# Patient Record
Sex: MALE | Race: WHITE | NOT HISPANIC OR LATINO
[De-identification: names, ages, dates, MRNs, and addresses within clinical notes are randomized per-mention and may not be internally consistent; named-entity substitution may affect disease eponyms.]

---

## 2017-01-07 ENCOUNTER — APPOINTMENT (OUTPATIENT)
Dept: ORTHOPEDIC SURGERY | Facility: CLINIC | Age: 57
End: 2017-01-07

## 2017-01-07 VITALS
SYSTOLIC BLOOD PRESSURE: 136 MMHG | WEIGHT: 230 LBS | DIASTOLIC BLOOD PRESSURE: 89 MMHG | HEIGHT: 74 IN | HEART RATE: 64 BPM | BODY MASS INDEX: 29.52 KG/M2

## 2017-01-30 ENCOUNTER — APPOINTMENT (OUTPATIENT)
Dept: ELECTROPHYSIOLOGY | Facility: CLINIC | Age: 57
End: 2017-01-30

## 2017-02-14 ENCOUNTER — APPOINTMENT (OUTPATIENT)
Dept: ELECTROPHYSIOLOGY | Facility: CLINIC | Age: 57
End: 2017-02-14

## 2017-03-03 ENCOUNTER — APPOINTMENT (OUTPATIENT)
Dept: ELECTROPHYSIOLOGY | Facility: CLINIC | Age: 57
End: 2017-03-03

## 2017-04-03 ENCOUNTER — APPOINTMENT (OUTPATIENT)
Dept: ELECTROPHYSIOLOGY | Facility: CLINIC | Age: 57
End: 2017-04-03

## 2017-04-12 ENCOUNTER — APPOINTMENT (OUTPATIENT)
Dept: ORTHOPEDIC SURGERY | Facility: CLINIC | Age: 57
End: 2017-04-12

## 2017-04-12 VITALS
HEART RATE: 73 BPM | HEIGHT: 74 IN | BODY MASS INDEX: 29.52 KG/M2 | WEIGHT: 230 LBS | DIASTOLIC BLOOD PRESSURE: 80 MMHG | SYSTOLIC BLOOD PRESSURE: 138 MMHG

## 2017-04-14 LAB
B PERT IGG+IGM PNL SER: ABNORMAL
COLOR FLD: NORMAL
CRYSTALS SNV QL MICRO: NORMAL
FLUID INTAKE SUBSTANCE CLASS: NORMAL
LYMPHOCYTES # FLD MANUAL: 12 %
MONOS+MACROS NFR FLD MANUAL: 83 %
NEUTS SEG # FLD MANUAL: 5 %
RBC # FLD MANUAL: ABNORMAL /UL
TOTAL CELLS COUNTED FLD: 840 /UL
TUBE TYPE: NORMAL

## 2017-04-19 LAB — BACTERIA FLD CULT: NORMAL

## 2017-05-05 ENCOUNTER — APPOINTMENT (OUTPATIENT)
Dept: ELECTROPHYSIOLOGY | Facility: CLINIC | Age: 57
End: 2017-05-05

## 2017-07-25 ENCOUNTER — OUTPATIENT (OUTPATIENT)
Dept: OUTPATIENT SERVICES | Facility: HOSPITAL | Age: 57
LOS: 1 days | End: 2017-07-25
Payer: COMMERCIAL

## 2017-07-25 VITALS
SYSTOLIC BLOOD PRESSURE: 150 MMHG | RESPIRATION RATE: 18 BRPM | WEIGHT: 246.92 LBS | TEMPERATURE: 98 F | HEIGHT: 74 IN | HEART RATE: 66 BPM | DIASTOLIC BLOOD PRESSURE: 89 MMHG

## 2017-07-25 DIAGNOSIS — Z98.61 CORONARY ANGIOPLASTY STATUS: Chronic | ICD-10-CM

## 2017-07-25 DIAGNOSIS — Z96.653 PRESENCE OF ARTIFICIAL KNEE JOINT, BILATERAL: Chronic | ICD-10-CM

## 2017-07-25 DIAGNOSIS — I10 ESSENTIAL (PRIMARY) HYPERTENSION: ICD-10-CM

## 2017-07-25 DIAGNOSIS — Z01.818 ENCOUNTER FOR OTHER PREPROCEDURAL EXAMINATION: ICD-10-CM

## 2017-07-25 DIAGNOSIS — Z98.890 OTHER SPECIFIED POSTPROCEDURAL STATES: Chronic | ICD-10-CM

## 2017-07-25 DIAGNOSIS — Z90.89 ACQUIRED ABSENCE OF OTHER ORGANS: Chronic | ICD-10-CM

## 2017-07-25 DIAGNOSIS — T84.84XA PAIN DUE TO INTERNAL ORTHOPEDIC PROSTHETIC DEVICES, IMPLANTS AND GRAFTS, INITIAL ENCOUNTER: ICD-10-CM

## 2017-07-25 DIAGNOSIS — Z98.89 OTHER SPECIFIED POSTPROCEDURAL STATES: Chronic | ICD-10-CM

## 2017-07-25 LAB
ANION GAP SERPL CALC-SCNC: 13 MMOL/L — SIGNIFICANT CHANGE UP (ref 5–17)
APTT BLD: 27.5 SEC — SIGNIFICANT CHANGE UP (ref 27.5–37.4)
BASOPHILS # BLD AUTO: 0 K/UL — SIGNIFICANT CHANGE UP (ref 0–0.2)
BASOPHILS NFR BLD AUTO: 0.3 % — SIGNIFICANT CHANGE UP (ref 0–2)
BUN SERPL-MCNC: 20 MG/DL — SIGNIFICANT CHANGE UP (ref 8–20)
CALCIUM SERPL-MCNC: 9.2 MG/DL — SIGNIFICANT CHANGE UP (ref 8.6–10.2)
CHLORIDE SERPL-SCNC: 107 MMOL/L — SIGNIFICANT CHANGE UP (ref 98–107)
CO2 SERPL-SCNC: 24 MMOL/L — SIGNIFICANT CHANGE UP (ref 22–29)
CREAT SERPL-MCNC: 0.83 MG/DL — SIGNIFICANT CHANGE UP (ref 0.5–1.3)
EOSINOPHIL # BLD AUTO: 0.2 K/UL — SIGNIFICANT CHANGE UP (ref 0–0.5)
EOSINOPHIL NFR BLD AUTO: 3.1 % — SIGNIFICANT CHANGE UP (ref 0–5)
GLUCOSE SERPL-MCNC: 123 MG/DL — HIGH (ref 70–115)
HCT VFR BLD CALC: 41 % — LOW (ref 42–52)
HGB BLD-MCNC: 14.3 G/DL — SIGNIFICANT CHANGE UP (ref 14–18)
INR BLD: 0.96 RATIO — SIGNIFICANT CHANGE UP (ref 0.88–1.16)
LYMPHOCYTES # BLD AUTO: 1.4 K/UL — SIGNIFICANT CHANGE UP (ref 1–4.8)
LYMPHOCYTES # BLD AUTO: 24 % — SIGNIFICANT CHANGE UP (ref 20–55)
MCHC RBC-ENTMCNC: 30.2 PG — SIGNIFICANT CHANGE UP (ref 27–31)
MCHC RBC-ENTMCNC: 34.9 G/DL — SIGNIFICANT CHANGE UP (ref 32–36)
MCV RBC AUTO: 86.5 FL — SIGNIFICANT CHANGE UP (ref 80–94)
MONOCYTES # BLD AUTO: 0.4 K/UL — SIGNIFICANT CHANGE UP (ref 0–0.8)
MONOCYTES NFR BLD AUTO: 6.5 % — SIGNIFICANT CHANGE UP (ref 3–10)
NEUTROPHILS # BLD AUTO: 3.8 K/UL — SIGNIFICANT CHANGE UP (ref 1.8–8)
NEUTROPHILS NFR BLD AUTO: 65.6 % — SIGNIFICANT CHANGE UP (ref 37–73)
PLATELET # BLD AUTO: 143 K/UL — LOW (ref 150–400)
POTASSIUM SERPL-MCNC: 3.9 MMOL/L — SIGNIFICANT CHANGE UP (ref 3.5–5.3)
POTASSIUM SERPL-SCNC: 3.9 MMOL/L — SIGNIFICANT CHANGE UP (ref 3.5–5.3)
PROTHROM AB SERPL-ACNC: 10.6 SEC — SIGNIFICANT CHANGE UP (ref 9.8–12.7)
RBC # BLD: 4.74 M/UL — SIGNIFICANT CHANGE UP (ref 4.6–6.2)
RBC # FLD: 13.2 % — SIGNIFICANT CHANGE UP (ref 11–15.6)
SODIUM SERPL-SCNC: 144 MMOL/L — SIGNIFICANT CHANGE UP (ref 135–145)
WBC # BLD: 5.8 K/UL — SIGNIFICANT CHANGE UP (ref 4.8–10.8)
WBC # FLD AUTO: 5.8 K/UL — SIGNIFICANT CHANGE UP (ref 4.8–10.8)

## 2017-07-25 PROCEDURE — 85027 COMPLETE CBC AUTOMATED: CPT

## 2017-07-25 PROCEDURE — 93010 ELECTROCARDIOGRAM REPORT: CPT

## 2017-07-25 PROCEDURE — G0463: CPT

## 2017-07-25 PROCEDURE — 85610 PROTHROMBIN TIME: CPT

## 2017-07-25 PROCEDURE — 36415 COLL VENOUS BLD VENIPUNCTURE: CPT

## 2017-07-25 PROCEDURE — 85730 THROMBOPLASTIN TIME PARTIAL: CPT

## 2017-07-25 PROCEDURE — 80048 BASIC METABOLIC PNL TOTAL CA: CPT

## 2017-07-25 PROCEDURE — 93005 ELECTROCARDIOGRAM TRACING: CPT

## 2017-07-25 NOTE — H&P PST ADULT - PMH
Back pain at L4-L5 level  has a spinal stimulator  Dyslipidemia    HTN (hypertension)    Sleep apnea with use of continuous positive airway pressure (CPAP) Asthma  Allergy induced, rarely uses Proventil  Back pain at L4-L5 level  has a spinal stimulator  HTN (hypertension)    Sleep apnea with use of continuous positive airway pressure (CPAP)

## 2017-07-25 NOTE — H&P PST ADULT - BLOOD TRANSFUSION, PREVIOUS, PROFILE
Dr. Pickens Note: unlikely fractured given exam, advised for xray to r/o occult tib fx, pt declined
no

## 2017-07-25 NOTE — ASU PATIENT PROFILE, ADULT - PMH
Asthma  Allergy induced, rarely uses Proventil  Back pain at L4-L5 level  has a spinal stimulator  HTN (hypertension)    Sleep apnea with use of continuous positive airway pressure (CPAP)

## 2017-07-25 NOTE — H&P PST ADULT - ASSESSMENT
56M PMH HTN, Asthma, Sleep Apnea, Chronic Back Pain and Osteoarthritis with painful total left knee replacement for Left Knee Arthroscopy, Partial Synovectomy.

## 2017-07-25 NOTE — H&P PST ADULT - HISTORY OF PRESENT ILLNESS
56M with left knee pain, following bilateral total knee replacement, for surgical repair. 56M with left knee pain, following bilateral total knee replacement, for surgical repair. Had bilateral knee replacement in April 2016 with pain in both knees.

## 2017-07-25 NOTE — H&P PST ADULT - PSH
H/O cardiac catheterization    H/O umbilical hernia repair  2014  History of spinal surgery  Insertion of spinal stimulator 2007  History of tonsillectomy  1965  History of total bilateral knee replacement

## 2017-07-25 NOTE — H&P PST ADULT - FAMILY HISTORY
Father  Still living? No  Family history of lung cancer, Age at diagnosis: Age Unknown     Mother  Still living? No  Family history of heart disease, Age at diagnosis: Age Unknown  Family history of diabetes mellitus, Age at diagnosis: Age Unknown

## 2017-08-10 ENCOUNTER — OUTPATIENT (OUTPATIENT)
Dept: OUTPATIENT SERVICES | Facility: HOSPITAL | Age: 57
LOS: 1 days | End: 2017-08-10
Payer: COMMERCIAL

## 2017-08-10 ENCOUNTER — APPOINTMENT (OUTPATIENT)
Dept: ORTHOPEDIC SURGERY | Facility: HOSPITAL | Age: 57
End: 2017-08-10

## 2017-08-10 ENCOUNTER — RESULT REVIEW (OUTPATIENT)
Age: 57
End: 2017-08-10

## 2017-08-10 VITALS
OXYGEN SATURATION: 95 % | HEART RATE: 78 BPM | TEMPERATURE: 98 F | SYSTOLIC BLOOD PRESSURE: 123 MMHG | HEIGHT: 74 IN | DIASTOLIC BLOOD PRESSURE: 61 MMHG | RESPIRATION RATE: 16 BRPM | WEIGHT: 246.92 LBS

## 2017-08-10 VITALS
HEART RATE: 66 BPM | OXYGEN SATURATION: 95 % | RESPIRATION RATE: 16 BRPM | SYSTOLIC BLOOD PRESSURE: 119 MMHG | TEMPERATURE: 98 F | DIASTOLIC BLOOD PRESSURE: 62 MMHG

## 2017-08-10 DIAGNOSIS — Z98.89 OTHER SPECIFIED POSTPROCEDURAL STATES: Chronic | ICD-10-CM

## 2017-08-10 DIAGNOSIS — Z90.89 ACQUIRED ABSENCE OF OTHER ORGANS: Chronic | ICD-10-CM

## 2017-08-10 DIAGNOSIS — Z96.653 PRESENCE OF ARTIFICIAL KNEE JOINT, BILATERAL: Chronic | ICD-10-CM

## 2017-08-10 DIAGNOSIS — Z98.890 OTHER SPECIFIED POSTPROCEDURAL STATES: Chronic | ICD-10-CM

## 2017-08-10 DIAGNOSIS — T84.84XA PAIN DUE TO INTERNAL ORTHOPEDIC PROSTHETIC DEVICES, IMPLANTS AND GRAFTS, INITIAL ENCOUNTER: ICD-10-CM

## 2017-08-10 LAB
B PERT IGG+IGM PNL SER: ABNORMAL
COLOR FLD: YELLOW
FLUID INTAKE SUBSTANCE CLASS: SIGNIFICANT CHANGE UP
FLUID SEGMENTED GRANULOCYTES: 3 % — SIGNIFICANT CHANGE UP
GRAM STN FLD: SIGNIFICANT CHANGE UP
LYMPHOCYTES # FLD: 90 % — SIGNIFICANT CHANGE UP
MESOTHL CELL # FLD: 1 % — SIGNIFICANT CHANGE UP
MONOS+MACROS # FLD: 6 % — SIGNIFICANT CHANGE UP
RCV VOL RI: 1190 /UL — HIGH (ref 0–5)
SPECIMEN SOURCE FLD: SIGNIFICANT CHANGE UP
SPECIMEN SOURCE: SIGNIFICANT CHANGE UP
TOTAL NUCLEATED CELL COUNT, BODY FLUID: 395 /UL — HIGH (ref 0–5)
TUBE TYPE: SIGNIFICANT CHANGE UP

## 2017-08-10 PROCEDURE — 87075 CULTR BACTERIA EXCEPT BLOOD: CPT

## 2017-08-10 PROCEDURE — 88304 TISSUE EXAM BY PATHOLOGIST: CPT

## 2017-08-10 PROCEDURE — 29876 ARTHRS KNEE SURG SYNVCT MAJ: CPT | Mod: LT

## 2017-08-10 PROCEDURE — 87205 SMEAR GRAM STAIN: CPT

## 2017-08-10 PROCEDURE — 89051 BODY FLUID CELL COUNT: CPT

## 2017-08-10 PROCEDURE — 87070 CULTURE OTHR SPECIMN AEROBIC: CPT

## 2017-08-10 PROCEDURE — 88304 TISSUE EXAM BY PATHOLOGIST: CPT | Mod: 26

## 2017-08-10 RX ORDER — OXYCODONE AND ACETAMINOPHEN 5; 325 MG/1; MG/1
2 TABLET ORAL EVERY 4 HOURS
Qty: 0 | Refills: 0 | Status: DISCONTINUED | OUTPATIENT
Start: 2017-08-10 | End: 2017-08-10

## 2017-08-10 RX ORDER — ONDANSETRON 8 MG/1
4 TABLET, FILM COATED ORAL ONCE
Qty: 0 | Refills: 0 | Status: DISCONTINUED | OUTPATIENT
Start: 2017-08-10 | End: 2017-08-10

## 2017-08-10 RX ORDER — CEFAZOLIN SODIUM 1 G
2000 VIAL (EA) INJECTION ONCE
Qty: 0 | Refills: 0 | Status: DISCONTINUED | OUTPATIENT
Start: 2017-08-10 | End: 2017-08-10

## 2017-08-10 RX ORDER — FENTANYL CITRATE 50 UG/ML
50 INJECTION INTRAVENOUS
Qty: 0 | Refills: 0 | Status: DISCONTINUED | OUTPATIENT
Start: 2017-08-10 | End: 2017-08-10

## 2017-08-10 RX ORDER — SODIUM CHLORIDE 9 MG/ML
3 INJECTION INTRAMUSCULAR; INTRAVENOUS; SUBCUTANEOUS ONCE
Qty: 0 | Refills: 0 | Status: DISCONTINUED | OUTPATIENT
Start: 2017-08-10 | End: 2017-08-10

## 2017-08-10 RX ORDER — SODIUM CHLORIDE 9 MG/ML
1000 INJECTION, SOLUTION INTRAVENOUS
Qty: 0 | Refills: 0 | Status: DISCONTINUED | OUTPATIENT
Start: 2017-08-10 | End: 2017-08-10

## 2017-08-10 RX ORDER — OXYCODONE AND ACETAMINOPHEN 5; 325 MG/1; MG/1
1 TABLET ORAL EVERY 4 HOURS
Qty: 0 | Refills: 0 | Status: DISCONTINUED | OUTPATIENT
Start: 2017-08-10 | End: 2017-08-10

## 2017-08-10 RX ADMIN — OXYCODONE AND ACETAMINOPHEN 2 TABLET(S): 5; 325 TABLET ORAL at 09:57

## 2017-08-10 NOTE — PHYSICAL THERAPY INITIAL EVALUATION ADULT - ADDITIONAL COMMENTS
Pt lives with family in a 1 story house with 3 steps to enter.  Independent with all PTA, without devices.

## 2017-08-10 NOTE — ASU DISCHARGE PLAN (ADULT/PEDIATRIC). - MEDICATION SUMMARY - MEDICATIONS TO TAKE
I will START or STAY ON the medications listed below when I get home from the hospital:    acetaminophen-hydrocodone 325 mg-5 mg oral tablet  -- 1-2  tab(s) by mouth every 4 to 6 hours, As Needed MDD:6  -- Caution federal law prohibits the transfer of this drug to any person other  than the person for whom it was prescribed.  May cause drowsiness.  Alcohol may intensify this effect.  Use care when operating dangerous machinery.  This product contains acetaminophen.  Do not use  with any other product containing acetaminophen to prevent possible liver damage.  Using more of this medication than prescribed may cause serious breathing problems.    -- Indication: For Pain      aspirin 81 mg oral tablet  -- 1 tab(s) by mouth once a day  -- Indication: For home med    diclofenac sodium 100 mg oral tablet, extended release  -- 1 tab(s) by mouth once a day  -- Indication: For home med    valsartan-hydrochlorothiazide 320mg-25mg oral tablet  -- 1 tab(s) by mouth once a day  -- Indication: For home med    Proventil HFA 90 mcg/inh inhalation aerosol  -- 2 puff(s) inhaled 4 times a day, As Needed - for shortness of breath and/or wheezing  -- Indication: For home med    pantoprazole 40 mg oral delayed release tablet  -- 1 tab(s) by mouth once a day  -- Indication: For home med    hydrALAZINE 50 mg oral tablet  -- 1 tab(s) by mouth 3 times a day  -- Indication: For home med    Daily Multiple Vitamins Multiple Vitamins oral tablet  -- 1 tab(s) by mouth once a day  -- Indication: For home med    Vitamin D3  -- 1 tab(s) by mouth once a day  -- Indication: For home med

## 2017-08-11 ENCOUNTER — TRANSCRIPTION ENCOUNTER (OUTPATIENT)
Age: 57
End: 2017-08-11

## 2017-08-15 LAB
CULTURE RESULTS: SIGNIFICANT CHANGE UP
SPECIMEN SOURCE: SIGNIFICANT CHANGE UP

## 2017-08-16 LAB — SURGICAL PATHOLOGY FINAL REPORT - CH: SIGNIFICANT CHANGE UP

## 2017-08-21 ENCOUNTER — APPOINTMENT (OUTPATIENT)
Dept: ORTHOPEDIC SURGERY | Facility: CLINIC | Age: 57
End: 2017-08-21
Payer: COMMERCIAL

## 2017-08-21 PROCEDURE — 99024 POSTOP FOLLOW-UP VISIT: CPT

## 2017-10-11 ENCOUNTER — APPOINTMENT (OUTPATIENT)
Dept: ORTHOPEDIC SURGERY | Facility: CLINIC | Age: 57
End: 2017-10-11
Payer: COMMERCIAL

## 2017-10-11 VITALS
BODY MASS INDEX: 29.52 KG/M2 | WEIGHT: 230 LBS | HEART RATE: 80 BPM | SYSTOLIC BLOOD PRESSURE: 123 MMHG | DIASTOLIC BLOOD PRESSURE: 75 MMHG | TEMPERATURE: 98.7 F | HEIGHT: 74 IN

## 2017-10-11 DIAGNOSIS — Z96.653 PRESENCE OF ARTIFICIAL KNEE JOINT, BILATERAL: ICD-10-CM

## 2017-10-11 DIAGNOSIS — M25.552 PAIN IN LEFT HIP: ICD-10-CM

## 2017-10-11 DIAGNOSIS — Z47.1 AFTERCARE FOLLOWING JOINT REPLACEMENT SURGERY: ICD-10-CM

## 2017-10-11 DIAGNOSIS — Z96.653 AFTERCARE FOLLOWING JOINT REPLACEMENT SURGERY: ICD-10-CM

## 2017-10-11 PROCEDURE — 99024 POSTOP FOLLOW-UP VISIT: CPT

## 2017-10-11 PROCEDURE — 72170 X-RAY EXAM OF PELVIS: CPT

## 2017-11-01 ENCOUNTER — FORM ENCOUNTER (OUTPATIENT)
Age: 57
End: 2017-11-01

## 2017-11-01 ENCOUNTER — LABORATORY RESULT (OUTPATIENT)
Age: 57
End: 2017-11-01

## 2017-11-01 ENCOUNTER — APPOINTMENT (OUTPATIENT)
Dept: PULMONOLOGY | Facility: CLINIC | Age: 57
End: 2017-11-01
Payer: COMMERCIAL

## 2017-11-01 VITALS
DIASTOLIC BLOOD PRESSURE: 80 MMHG | HEART RATE: 59 BPM | OXYGEN SATURATION: 98 % | HEIGHT: 72 IN | BODY MASS INDEX: 32.23 KG/M2 | SYSTOLIC BLOOD PRESSURE: 132 MMHG | RESPIRATION RATE: 16 BRPM | WEIGHT: 238 LBS

## 2017-11-01 PROCEDURE — 94010 BREATHING CAPACITY TEST: CPT

## 2017-11-01 PROCEDURE — 94727 GAS DIL/WSHOT DETER LNG VOL: CPT

## 2017-11-01 PROCEDURE — 94729 DIFFUSING CAPACITY: CPT

## 2017-11-01 PROCEDURE — 99215 OFFICE O/P EST HI 40 MIN: CPT | Mod: 25

## 2017-11-01 PROCEDURE — 85018 HEMOGLOBIN: CPT | Mod: QW

## 2017-11-02 ENCOUNTER — OUTPATIENT (OUTPATIENT)
Dept: OUTPATIENT SERVICES | Facility: HOSPITAL | Age: 57
LOS: 1 days | End: 2017-11-02
Payer: COMMERCIAL

## 2017-11-02 ENCOUNTER — APPOINTMENT (OUTPATIENT)
Dept: RADIOLOGY | Facility: CLINIC | Age: 57
End: 2017-11-02
Payer: COMMERCIAL

## 2017-11-02 ENCOUNTER — APPOINTMENT (OUTPATIENT)
Dept: ULTRASOUND IMAGING | Facility: CLINIC | Age: 57
End: 2017-11-02
Payer: COMMERCIAL

## 2017-11-02 ENCOUNTER — EMERGENCY (EMERGENCY)
Facility: HOSPITAL | Age: 57
LOS: 1 days | Discharge: HOME | End: 2017-11-02
Attending: EMERGENCY MEDICINE
Payer: COMMERCIAL

## 2017-11-02 VITALS
TEMPERATURE: 98 F | OXYGEN SATURATION: 97 % | HEIGHT: 74 IN | WEIGHT: 238.1 LBS | SYSTOLIC BLOOD PRESSURE: 171 MMHG | DIASTOLIC BLOOD PRESSURE: 95 MMHG | RESPIRATION RATE: 18 BRPM | HEART RATE: 66 BPM

## 2017-11-02 DIAGNOSIS — Z98.890 OTHER SPECIFIED POSTPROCEDURAL STATES: Chronic | ICD-10-CM

## 2017-11-02 DIAGNOSIS — Z98.89 OTHER SPECIFIED POSTPROCEDURAL STATES: Chronic | ICD-10-CM

## 2017-11-02 DIAGNOSIS — R06.00 DYSPNEA, UNSPECIFIED: ICD-10-CM

## 2017-11-02 DIAGNOSIS — Z90.89 ACQUIRED ABSENCE OF OTHER ORGANS: Chronic | ICD-10-CM

## 2017-11-02 DIAGNOSIS — Z96.653 PRESENCE OF ARTIFICIAL KNEE JOINT, BILATERAL: Chronic | ICD-10-CM

## 2017-11-02 LAB
A ALTERNATA IGE QN: <0.1 KUA/L
A FUMIGATUS IGE QN: <0.1 KUA/L
ALBUMIN SERPL ELPH-MCNC: 4.2 G/DL — SIGNIFICANT CHANGE UP (ref 3.3–5.2)
ALP SERPL-CCNC: 78 U/L — SIGNIFICANT CHANGE UP (ref 40–120)
ALT FLD-CCNC: 43 U/L — HIGH
ANION GAP SERPL CALC-SCNC: 12 MMOL/L — SIGNIFICANT CHANGE UP (ref 5–17)
AST SERPL-CCNC: 24 U/L — SIGNIFICANT CHANGE UP
BASOPHILS # BLD AUTO: 0 K/UL — SIGNIFICANT CHANGE UP (ref 0–0.2)
BASOPHILS NFR BLD AUTO: 0.4 % — SIGNIFICANT CHANGE UP (ref 0–2)
BILIRUB SERPL-MCNC: 0.3 MG/DL — LOW (ref 0.4–2)
BUN SERPL-MCNC: 14 MG/DL — SIGNIFICANT CHANGE UP (ref 8–20)
C ALBICANS IGE QN: 0.18 KUA/L
C HERBARUM IGE QN: <0.1 KUA/L
CALCIUM SERPL-MCNC: 9 MG/DL — SIGNIFICANT CHANGE UP (ref 8.6–10.2)
CAT DANDER IGE QN: <0.1 KUA/L
CHLORIDE SERPL-SCNC: 105 MMOL/L — SIGNIFICANT CHANGE UP (ref 98–107)
CO2 SERPL-SCNC: 24 MMOL/L — SIGNIFICANT CHANGE UP (ref 22–29)
COMMON RAGWEED IGE QN: <0.1 KUA/L
CREAT SERPL-MCNC: 0.83 MG/DL — SIGNIFICANT CHANGE UP (ref 0.5–1.3)
D FARINAE IGE QN: 0.1 KUA/L
D PTERONYSS IGE QN: 0.2 KUA/L
DEPRECATED A ALTERNATA IGE RAST QL: 0
DEPRECATED A FUMIGATUS IGE RAST QL: 0
DEPRECATED C ALBICANS IGE RAST QL: NORMAL
DEPRECATED C HERBARUM IGE RAST QL: 0
DEPRECATED CAT DANDER IGE RAST QL: 0
DEPRECATED COMMON RAGWEED IGE RAST QL: 0
DEPRECATED D FARINAE IGE RAST QL: NORMAL
DEPRECATED D PTERONYSS IGE RAST QL: NORMAL
DEPRECATED DOG DANDER IGE RAST QL: 0
DEPRECATED M RACEMOSUS IGE RAST QL: 0
DEPRECATED ROACH IGE RAST QL: ABNORMAL
DEPRECATED TIMOTHY IGE RAST QL: 0
DEPRECATED WHITE OAK IGE RAST QL: 0
DOG DANDER IGE QN: <0.1 KUA/L
EOSINOPHIL # BLD AUTO: 0.2 K/UL — SIGNIFICANT CHANGE UP (ref 0–0.5)
EOSINOPHIL NFR BLD AUTO: 3.7 % — SIGNIFICANT CHANGE UP (ref 0–5)
GLUCOSE SERPL-MCNC: 120 MG/DL — HIGH (ref 70–115)
HCT VFR BLD CALC: 40.2 % — LOW (ref 42–52)
HGB BLD-MCNC: 14 G/DL — SIGNIFICANT CHANGE UP (ref 14–18)
LYMPHOCYTES # BLD AUTO: 1.6 K/UL — SIGNIFICANT CHANGE UP (ref 1–4.8)
LYMPHOCYTES # BLD AUTO: 28.7 % — SIGNIFICANT CHANGE UP (ref 20–55)
M RACEMOSUS IGE QN: <0.1 KUA/L
MCHC RBC-ENTMCNC: 30.3 PG — SIGNIFICANT CHANGE UP (ref 27–31)
MCHC RBC-ENTMCNC: 34.8 G/DL — SIGNIFICANT CHANGE UP (ref 32–36)
MCV RBC AUTO: 87 FL — SIGNIFICANT CHANGE UP (ref 80–94)
MONOCYTES # BLD AUTO: 0.4 K/UL — SIGNIFICANT CHANGE UP (ref 0–0.8)
MONOCYTES NFR BLD AUTO: 7.7 % — SIGNIFICANT CHANGE UP (ref 3–10)
NEUTROPHILS # BLD AUTO: 3.2 K/UL — SIGNIFICANT CHANGE UP (ref 1.8–8)
NEUTROPHILS NFR BLD AUTO: 59.1 % — SIGNIFICANT CHANGE UP (ref 37–73)
PLATELET # BLD AUTO: 164 K/UL — SIGNIFICANT CHANGE UP (ref 150–400)
POTASSIUM SERPL-MCNC: 3.9 MMOL/L — SIGNIFICANT CHANGE UP (ref 3.5–5.3)
POTASSIUM SERPL-SCNC: 3.9 MMOL/L — SIGNIFICANT CHANGE UP (ref 3.5–5.3)
PROT SERPL-MCNC: 6.7 G/DL — SIGNIFICANT CHANGE UP (ref 6.6–8.7)
RBC # BLD: 4.62 M/UL — SIGNIFICANT CHANGE UP (ref 4.6–6.2)
RBC # FLD: 13.5 % — SIGNIFICANT CHANGE UP (ref 11–15.6)
ROACH IGE QN: 1.79 KUA/L
SODIUM SERPL-SCNC: 141 MMOL/L — SIGNIFICANT CHANGE UP (ref 135–145)
TIMOTHY IGE QN: <0.1 KUA/L
TOTAL IGE SMQN RAST: 240 KU/L
WBC # BLD: 5.5 K/UL — SIGNIFICANT CHANGE UP (ref 4.8–10.8)
WBC # FLD AUTO: 5.5 K/UL — SIGNIFICANT CHANGE UP (ref 4.8–10.8)
WHITE OAK IGE QN: <0.1 KUA/L

## 2017-11-02 PROCEDURE — 99284 EMERGENCY DEPT VISIT MOD MDM: CPT

## 2017-11-02 PROCEDURE — 99284 EMERGENCY DEPT VISIT MOD MDM: CPT | Mod: 25

## 2017-11-02 PROCEDURE — 93970 EXTREMITY STUDY: CPT | Mod: 26

## 2017-11-02 PROCEDURE — 80053 COMPREHEN METABOLIC PANEL: CPT

## 2017-11-02 PROCEDURE — 36415 COLL VENOUS BLD VENIPUNCTURE: CPT

## 2017-11-02 PROCEDURE — 71275 CT ANGIOGRAPHY CHEST: CPT | Mod: 26

## 2017-11-02 PROCEDURE — 71275 CT ANGIOGRAPHY CHEST: CPT

## 2017-11-02 PROCEDURE — 85027 COMPLETE CBC AUTOMATED: CPT

## 2017-11-02 PROCEDURE — 93970 EXTREMITY STUDY: CPT

## 2017-11-02 RX ORDER — FONDAPARINUX SODIUM 2.5 MG/.5ML
1 INJECTION, SOLUTION SUBCUTANEOUS
Qty: 42 | Refills: 0
Start: 2017-11-02 | End: 2017-11-23

## 2017-11-02 RX ORDER — RIVAROXABAN 15 MG-20MG
15 KIT ORAL ONCE
Qty: 0 | Refills: 0 | Status: COMPLETED | OUTPATIENT
Start: 2017-11-02 | End: 2017-11-02

## 2017-11-02 RX ORDER — CHOLECALCIFEROL (VITAMIN D3) 125 MCG
1 CAPSULE ORAL
Qty: 0 | Refills: 0 | COMMUNITY

## 2017-11-02 RX ADMIN — RIVAROXABAN 15 MILLIGRAM(S): KIT at 16:28

## 2017-11-02 NOTE — ED ADULT NURSE REASSESSMENT NOTE - NS ED NURSE REASSESS COMMENT FT1
Report received from offgoing RN, chart as noted, pt a&ox3 in no apparent distress, resting comfortably on stretcher. #20G IV noted to right to of hand, site asymp. pending poc from md, will continue to monitor and reassess

## 2017-11-02 NOTE — ED ADULT TRIAGE NOTE - CHIEF COMPLAINT QUOTE
Patient arrived to ED today with c/o blood clot to his left leg.  Patient had sonogram today which showed DVT.

## 2017-11-02 NOTE — ED STATDOCS - MUSCULOSKELETAL FINDINGS, MLM
motor intact/tenderness to L posterior calf to palpation./neck supple/normal range of motion/no muscle tenderness tenderness to L posterior calf to palpation. L calf pain./no muscle tenderness/motor intact/normal range of motion/neck supple

## 2017-11-02 NOTE — ED ADULT NURSE NOTE - OBJECTIVE STATEMENT
pt c/o left upper leg pain. pt states that he has had swelling to that leg since 2016 after bilateral knee replacement.

## 2017-11-02 NOTE — ED STATDOCS - PROGRESS NOTE DETAILS
Spoke to Dr. Lai(Kugc=[id number]) wishes for pt to have CTA in ED today even though pt has no complaints and does not meet PERC criteria. PA NOTE: Pt seen by intake physician and hpi/orders/plan reviewed. Will follow up plan as per intake physician.

## 2017-11-02 NOTE — ED STATDOCS - ATTENDING CONTRIBUTION TO CARE
I, Christiana Smith, performed the initial face to face bedside interview with this patient regarding history of present illness, review of symptoms and relevant past medical, social and family history.  I completed an independent physical examination.  I was the initial provider who evaluated this patient.  The history, relevant review of systems, past medical and surgical history, medical decision making, and physical examination was documented by the scribe in my presence and I attest to the accuracy of the documentation.  I have signed out the follow up of any pending tests (i.e. labs, radiological studies) to the ACP.  I have communicated the patient’s plan of care and disposition with the ACP.

## 2017-11-02 NOTE — ED STATDOCS - SKIN, MLM
skin normal color for race, warm, dry and intact. skin normal color for race, warm, dry and intact. No pallor, no cyanosis.

## 2017-11-02 NOTE — ED STATDOCS - CARE PLAN
Principal Discharge DX:	Deep vein thrombosis (DVT) of distal vein of left lower extremity, unspecified chronicity

## 2017-11-02 NOTE — ED STATDOCS - RESPIRATORY, MLM
breath sounds clear and equal bilaterally. breath sounds clear and equal bilaterally. No respiratory distress. No hypoxia.

## 2017-11-02 NOTE — ED STATDOCS - OBJECTIVE STATEMENT
with a PMHx of b/l knee replacement 1 year ago presents to the ED c/o b/l leg swelling and intermittent SOB. Pulmonologist: Dr. Lai. Went to [facility] imaging who contacted his Pulmonologist. Pulmonologist sent the pt to the ED. Currently soni 57 y/o M pt with a PMHx of b/l knee replacement 1 year ago presents to the ED c/o b/l leg swelling and intermittent SOB. Pulmonologist: Dr. Lai. Went to [facility] imaging who contacted his Pulmonologist. Pulmonologist sent the pt to the ED. Currently soni 55 y/o M pt with a PMHx of b/l knee replacement 1 year ago presents to the ED c/o b/l leg swelling and intermittent SOB x6 months. Pulmonologist: Dr. Lai. Went to [facility] imaging who contacted his Pulmonologist. Pulmonologist sent the pt to the ED. Has no complaints at bedside. NKDA. 57 y/o M pt with a PMHx of b/l knee replacement 1 year ago presents to the ED c/o b/l leg swelling and intermittent SOB x6 months. Pulmonologist: Dr. Lai.   He had an ultrasound done today as an outpatient which showed a DVT.  His pulmonologist was called from the imaging center and was told to come to the ER.  Pulmonologist sent the pt to the ED. Has no complaints at bedside. NKDA.

## 2017-11-09 ENCOUNTER — MESSAGE (OUTPATIENT)
Age: 57
End: 2017-11-09

## 2017-11-13 ENCOUNTER — APPOINTMENT (OUTPATIENT)
Dept: VASCULAR SURGERY | Facility: CLINIC | Age: 57
End: 2017-11-13
Payer: COMMERCIAL

## 2017-11-13 VITALS
WEIGHT: 248 LBS | HEART RATE: 75 BPM | BODY MASS INDEX: 33.59 KG/M2 | DIASTOLIC BLOOD PRESSURE: 89 MMHG | SYSTOLIC BLOOD PRESSURE: 155 MMHG | RESPIRATION RATE: 16 BRPM | TEMPERATURE: 98.3 F | HEIGHT: 72 IN | OXYGEN SATURATION: 95 %

## 2017-11-13 DIAGNOSIS — Z82.49 FAMILY HISTORY OF ISCHEMIC HEART DISEASE AND OTHER DISEASES OF THE CIRCULATORY SYSTEM: ICD-10-CM

## 2017-11-13 DIAGNOSIS — Z83.3 FAMILY HISTORY OF DIABETES MELLITUS: ICD-10-CM

## 2017-11-13 PROCEDURE — 99243 OFF/OP CNSLTJ NEW/EST LOW 30: CPT

## 2017-11-14 ENCOUNTER — OUTPATIENT (OUTPATIENT)
Dept: OUTPATIENT SERVICES | Facility: HOSPITAL | Age: 57
LOS: 1 days | Discharge: HOME | End: 2017-11-14

## 2017-11-14 DIAGNOSIS — Z90.89 ACQUIRED ABSENCE OF OTHER ORGANS: Chronic | ICD-10-CM

## 2017-11-14 DIAGNOSIS — I80.9 PHLEBITIS AND THROMBOPHLEBITIS OF UNSPECIFIED SITE: ICD-10-CM

## 2017-11-14 DIAGNOSIS — Z96.653 PRESENCE OF ARTIFICIAL KNEE JOINT, BILATERAL: Chronic | ICD-10-CM

## 2017-11-14 DIAGNOSIS — Z98.890 OTHER SPECIFIED POSTPROCEDURAL STATES: Chronic | ICD-10-CM

## 2017-11-14 DIAGNOSIS — Z98.89 OTHER SPECIFIED POSTPROCEDURAL STATES: Chronic | ICD-10-CM

## 2017-11-16 PROBLEM — Z82.49 FAMILY HISTORY OF HYPERTENSION: Status: ACTIVE | Noted: 2017-11-13

## 2017-11-16 PROBLEM — Z82.49 FAMILY HISTORY OF HEART DISEASE: Status: ACTIVE | Noted: 2017-11-13

## 2017-11-16 PROBLEM — Z83.3 FAMILY HISTORY OF DIABETES MELLITUS: Status: ACTIVE | Noted: 2017-11-13

## 2017-11-17 ENCOUNTER — LABORATORY RESULT (OUTPATIENT)
Age: 57
End: 2017-11-17

## 2017-11-17 ENCOUNTER — APPOINTMENT (OUTPATIENT)
Dept: HEMATOLOGY ONCOLOGY | Facility: CLINIC | Age: 57
End: 2017-11-17
Payer: COMMERCIAL

## 2017-11-17 ENCOUNTER — OUTPATIENT (OUTPATIENT)
Dept: OUTPATIENT SERVICES | Facility: HOSPITAL | Age: 57
LOS: 1 days | End: 2017-11-17
Payer: COMMERCIAL

## 2017-11-17 ENCOUNTER — RESULT REVIEW (OUTPATIENT)
Age: 57
End: 2017-11-17

## 2017-11-17 VITALS
HEIGHT: 73.62 IN | WEIGHT: 250.64 LBS | HEART RATE: 85 BPM | SYSTOLIC BLOOD PRESSURE: 146 MMHG | BODY MASS INDEX: 32.51 KG/M2 | TEMPERATURE: 98 F | RESPIRATION RATE: 16 BRPM | OXYGEN SATURATION: 95 % | DIASTOLIC BLOOD PRESSURE: 90 MMHG

## 2017-11-17 DIAGNOSIS — Z98.890 OTHER SPECIFIED POSTPROCEDURAL STATES: Chronic | ICD-10-CM

## 2017-11-17 DIAGNOSIS — Z90.89 ACQUIRED ABSENCE OF OTHER ORGANS: Chronic | ICD-10-CM

## 2017-11-17 DIAGNOSIS — K21.9 GASTRO-ESOPHAGEAL REFLUX DISEASE W/OUT ESOPHAGITIS: ICD-10-CM

## 2017-11-17 DIAGNOSIS — I82.402 ACUTE EMBOLISM AND THROMBOSIS OF UNSPECIFIED DEEP VEINS OF LEFT LOWER EXTREMITY: ICD-10-CM

## 2017-11-17 DIAGNOSIS — K76.0 FATTY (CHANGE OF) LIVER, NOT ELSEWHERE CLASSIFIED: ICD-10-CM

## 2017-11-17 DIAGNOSIS — Z96.653 PRESENCE OF ARTIFICIAL KNEE JOINT, BILATERAL: Chronic | ICD-10-CM

## 2017-11-17 DIAGNOSIS — Z98.89 OTHER SPECIFIED POSTPROCEDURAL STATES: Chronic | ICD-10-CM

## 2017-11-17 LAB
BASOPHILS # BLD AUTO: 0.1 K/UL — SIGNIFICANT CHANGE UP (ref 0–0.2)
BASOPHILS NFR BLD AUTO: 1.1 % — SIGNIFICANT CHANGE UP (ref 0–2)
EOSINOPHIL # BLD AUTO: 0.2 K/UL — SIGNIFICANT CHANGE UP (ref 0–0.5)
EOSINOPHIL NFR BLD AUTO: 3.7 % — SIGNIFICANT CHANGE UP (ref 0–6)
HCT VFR BLD CALC: 42 % — SIGNIFICANT CHANGE UP (ref 39–50)
HGB BLD-MCNC: 14.7 G/DL — SIGNIFICANT CHANGE UP (ref 13–17)
LYMPHOCYTES # BLD AUTO: 1.4 K/UL — SIGNIFICANT CHANGE UP (ref 1–3.3)
LYMPHOCYTES # BLD AUTO: 24.4 % — SIGNIFICANT CHANGE UP (ref 13–44)
MCHC RBC-ENTMCNC: 30.4 PG — SIGNIFICANT CHANGE UP (ref 27–34)
MCHC RBC-ENTMCNC: 35 GM/DL — SIGNIFICANT CHANGE UP (ref 32–36)
MCV RBC AUTO: 86.8 FL — SIGNIFICANT CHANGE UP (ref 80–100)
MONOCYTES # BLD AUTO: 0.3 K/UL — SIGNIFICANT CHANGE UP (ref 0–0.9)
MONOCYTES NFR BLD AUTO: 5.8 % — SIGNIFICANT CHANGE UP (ref 2–14)
NEUTROPHILS # BLD AUTO: 3.7 K/UL — SIGNIFICANT CHANGE UP (ref 1.8–7.4)
NEUTROPHILS NFR BLD AUTO: 65 % — SIGNIFICANT CHANGE UP (ref 43–77)
PLATELET # BLD AUTO: 168 K/UL — SIGNIFICANT CHANGE UP (ref 150–400)
RBC # BLD: 4.83 M/UL — SIGNIFICANT CHANGE UP (ref 4.2–5.8)
RBC # FLD: 11.7 % — SIGNIFICANT CHANGE UP (ref 10.3–14.5)
WBC # BLD: 5.7 K/UL — SIGNIFICANT CHANGE UP (ref 3.8–10.5)
WBC # FLD AUTO: 5.7 K/UL — SIGNIFICANT CHANGE UP (ref 3.8–10.5)

## 2017-11-17 PROCEDURE — 81241 F5 GENE: CPT

## 2017-11-17 PROCEDURE — 99205 OFFICE O/P NEW HI 60 MIN: CPT

## 2017-11-17 PROCEDURE — G0452: CPT | Mod: 26

## 2017-11-17 PROCEDURE — 81240 F2 GENE: CPT

## 2017-11-17 RX ORDER — HYDROCODONE BITARTRATE AND ACETAMINOPHEN 5; 325 MG/1; MG/1
5-325 TABLET ORAL
Qty: 30 | Refills: 0 | Status: DISCONTINUED | COMMUNITY
Start: 2017-08-10 | End: 2017-11-17

## 2017-11-17 RX ORDER — AMOXICILLIN AND CLAVULANATE POTASSIUM 875; 125 MG/1; MG/1
875-125 TABLET, COATED ORAL
Qty: 20 | Refills: 0 | Status: DISCONTINUED | COMMUNITY
Start: 2017-05-31 | End: 2017-11-17

## 2017-11-17 RX ORDER — ASPIRIN 81 MG
81 TABLET, DELAYED RELEASE (ENTERIC COATED) ORAL
Refills: 0 | Status: DISCONTINUED | COMMUNITY
End: 2017-11-17

## 2017-11-17 RX ORDER — DICLOFENAC SODIUM 75 MG/1
TABLET, DELAYED RELEASE ORAL
Refills: 0 | Status: DISCONTINUED | COMMUNITY
End: 2017-11-17

## 2017-11-20 LAB
CARDIOLIPIN AB SER IA-ACNC: NEGATIVE
CARDIOLIPIN IGM SER-MCNC: <5 GPL
CARDIOLIPIN IGM SER-MCNC: <5 MPL

## 2017-11-21 DIAGNOSIS — I82.402 ACUTE EMBOLISM AND THROMBOSIS OF UNSPECIFIED DEEP VEINS OF LEFT LOWER EXTREMITY: ICD-10-CM

## 2017-11-22 LAB
AT III PPP CHRO-ACNC: 117 %
B2 GLYCOPROT1 IGA SERPL IA-ACNC: <5 SAU
B2 GLYCOPROT1 IGG SER-ACNC: <5 SGU
B2 GLYCOPROT1 IGM SER-ACNC: <5 SMU
PROT C PPP CHRO-ACNC: 89 %
PROT S AG ACT/NOR PPP IA: 130 %

## 2017-11-27 ENCOUNTER — TRANSCRIPTION ENCOUNTER (OUTPATIENT)
Age: 57
End: 2017-11-27

## 2018-02-05 ENCOUNTER — APPOINTMENT (OUTPATIENT)
Dept: VASCULAR SURGERY | Facility: CLINIC | Age: 58
End: 2018-02-05
Payer: COMMERCIAL

## 2018-02-05 VITALS
BODY MASS INDEX: 32.87 KG/M2 | TEMPERATURE: 99 F | SYSTOLIC BLOOD PRESSURE: 156 MMHG | WEIGHT: 248 LBS | DIASTOLIC BLOOD PRESSURE: 80 MMHG | OXYGEN SATURATION: 96 % | RESPIRATION RATE: 16 BRPM | HEART RATE: 91 BPM | HEIGHT: 73 IN

## 2018-02-05 PROCEDURE — 93971 EXTREMITY STUDY: CPT

## 2018-02-05 PROCEDURE — 99214 OFFICE O/P EST MOD 30 MIN: CPT

## 2018-02-06 ENCOUNTER — OUTPATIENT (OUTPATIENT)
Dept: OUTPATIENT SERVICES | Facility: HOSPITAL | Age: 58
LOS: 1 days | Discharge: HOME | End: 2018-02-06

## 2018-02-06 DIAGNOSIS — Z96.653 PRESENCE OF ARTIFICIAL KNEE JOINT, BILATERAL: Chronic | ICD-10-CM

## 2018-02-06 DIAGNOSIS — Z98.890 OTHER SPECIFIED POSTPROCEDURAL STATES: Chronic | ICD-10-CM

## 2018-02-06 DIAGNOSIS — Z90.89 ACQUIRED ABSENCE OF OTHER ORGANS: Chronic | ICD-10-CM

## 2018-02-06 DIAGNOSIS — I82.402 ACUTE EMBOLISM AND THROMBOSIS OF UNSPECIFIED DEEP VEINS OF LEFT LOWER EXTREMITY: ICD-10-CM

## 2018-02-06 DIAGNOSIS — Z98.89 OTHER SPECIFIED POSTPROCEDURAL STATES: Chronic | ICD-10-CM

## 2018-02-12 ENCOUNTER — APPOINTMENT (OUTPATIENT)
Dept: HEMATOLOGY ONCOLOGY | Facility: CLINIC | Age: 58
End: 2018-02-12
Payer: COMMERCIAL

## 2018-02-12 VITALS
WEIGHT: 245.04 LBS | BODY MASS INDEX: 32.33 KG/M2 | TEMPERATURE: 97.6 F | SYSTOLIC BLOOD PRESSURE: 147 MMHG | HEART RATE: 70 BPM | DIASTOLIC BLOOD PRESSURE: 85 MMHG | OXYGEN SATURATION: 96 %

## 2018-02-12 DIAGNOSIS — I82.402 ACUTE EMBOLISM AND THROMBOSIS OF UNSPECIFIED DEEP VEINS OF LEFT LOWER EXTREMITY: ICD-10-CM

## 2018-02-12 PROCEDURE — 99214 OFFICE O/P EST MOD 30 MIN: CPT

## 2018-02-12 RX ORDER — AMOXICILLIN 500 MG/1
500 CAPSULE ORAL
Qty: 40 | Refills: 0 | Status: DISCONTINUED | COMMUNITY
Start: 2017-10-16 | End: 2018-02-12

## 2018-05-02 ENCOUNTER — APPOINTMENT (OUTPATIENT)
Dept: PULMONOLOGY | Facility: CLINIC | Age: 58
End: 2018-05-02

## 2018-07-16 PROBLEM — J45.909 UNSPECIFIED ASTHMA, UNCOMPLICATED: Chronic | Status: ACTIVE | Noted: 2017-07-25

## 2019-07-03 ENCOUNTER — RX ONLY (RX ONLY)
Age: 59
End: 2019-07-03

## 2019-12-27 ENCOUNTER — APPOINTMENT (OUTPATIENT)
Dept: ORTHOPEDIC SURGERY | Facility: CLINIC | Age: 59
End: 2019-12-27
Payer: COMMERCIAL

## 2019-12-27 VITALS
SYSTOLIC BLOOD PRESSURE: 144 MMHG | WEIGHT: 230 LBS | HEART RATE: 64 BPM | HEIGHT: 73 IN | DIASTOLIC BLOOD PRESSURE: 82 MMHG | BODY MASS INDEX: 30.48 KG/M2

## 2019-12-27 DIAGNOSIS — T84.84XA PAIN DUE TO INTERNAL ORTHOPEDIC PROSTHETIC DEVICES, IMPLANTS AND GRAFTS, INITIAL ENCOUNTER: ICD-10-CM

## 2019-12-27 DIAGNOSIS — Z96.659 PAIN DUE TO INTERNAL ORTHOPEDIC PROSTHETIC DEVICES, IMPLANTS AND GRAFTS, INITIAL ENCOUNTER: ICD-10-CM

## 2019-12-27 PROCEDURE — 73562 X-RAY EXAM OF KNEE 3: CPT | Mod: 50

## 2019-12-27 PROCEDURE — 99214 OFFICE O/P EST MOD 30 MIN: CPT

## 2019-12-27 NOTE — HISTORY OF PRESENT ILLNESS
[de-identified] : Patient presents today for an initial evaluation of a painful bilateral knee replacements. He underwent initial surgery in April 2016 seen with Dr. Pantera Chong.  He reports doing well for the first 4 months. He states after 4 months,, his progress stopped and he developed swelling, pain and stiffness of both knees (left greater than right.) He continued physical therapy. Ultimately in October of 2017, he underwent a synovectomy of the left knee. He did not improve following the procedure. He was recommended a second opinion. He went no exudate. He saw Dr. Ram and had a polyethylene exchange with increased size of bearing. He reports that his condition has not improved with that procedure. He states the swelling when he stands for extended periods of time. He reports her pain to both anterior knees. The pain radiates proximally towards his hips. He is currently manages a spinal stimulator the lumbar spine for chronic back pain. He believes this pain is different. He has had extensive workup including knee aspiration advanced imaging. He reports that the implants are not loose and is not having infection. He's here today for further evaluation. He continues to have discomfort with increased activities. He has minimal pain at rest. No injuries or trauma is reported\par \par Review of Systems-\par Constitutional: No fever or chills. \par Cardiovascular: No orthopnea or chest pain\par Pulmonary: No shortness of breath. \par GI: No nausea or vomiting or abdominal pain.\par Musculoskeletal: see HPI \par Psychiatric: No anxiety and depression.

## 2019-12-27 NOTE — PHYSICAL EXAM
[de-identified] : The patient appears well nourished and in no apparent distress. The patient is alert and oriented to person, place, and time. Affect and mood appear normal. The head is normocephalic and atraumatic. The eyes reveal normal sclera and extra ocular muscles are intact. The mucous membranes are moist. Skin shows normal turgor with no evidence of eczema or psoriasis. No respiratory distress noted. Sensation grossly intact. MUSCULOSKELETAL:   SEE BELOW\par \par \par Bilateral knee exam demonstrates normal alignment. The exam demonstrates well-healed surgical scars consistent with past to an arthroplasty. No signs of acute trauma. Normal temperature. No erythema. No ecchymosis. Very minimal effusions bilaterally. Passive range of motion of both knees is zero to approximately 120° of flexion. No laxity in extension. No anterior or posterior laxity. No clicking with passive range of motion. No tenderness of the patella or medial compartments with palpation. Quadriceps and patellar tendons are both intact. No extensor mechanism lag. No flexion contracture. No calf tenderness. 2+ DP pulse. No venous stasis or ulceration. Normal sensation to light touch distally. [de-identified] : X-rays of both knees reviewed. Implants are in good position. No obvious signs of clinical loosening. No fractures

## 2019-12-27 NOTE — DISCUSSION/SUMMARY
[de-identified] : Today's x-rays reviewed with the patient. The patient's primary complaint is tightness of the left knee. Patient describes as the postoperative outcome not needing expectations of the procedure. Clinically, the patient's knee moves very well. He has not had any infections. He has not improved with left knee revision surgery to increase the poly-thickness. He now reports is some tightness of the knee with flexion , however his knee swelling has not improved. At this time is to do the patient's knee is stable. There is no clinical findings to suggest infection at this time. It is not anticipated patient benefit from a complete total knee revision arthroplasty. Furthermore, the patient is not recommended corticosteroid injections due to the risk of infection. The patient is recommended to continue to follow with pain management to improve his quality of life. The patient was explained the clinical decision making at great length. He'll be seen back in the future as needed.

## 2019-12-27 NOTE — DISCUSSION/SUMMARY
[de-identified] : Today's x-rays reviewed with the patient. The patient's primary complaint is tightness of the left knee. Patient describes as the postoperative outcome not needing expectations of the procedure. Clinically, the patient's knee moves very well. He has not had any infections. He has not improved with left knee revision surgery to increase the poly-thickness. He now reports is some tightness of the knee with flexion , however his knee swelling has not improved. At this time is to do the patient's knee is stable. There is no clinical findings to suggest infection at this time. It is not anticipated patient benefit from a complete total knee revision arthroplasty. Furthermore, the patient is not recommended corticosteroid injections due to the risk of infection. The patient is recommended to continue to follow with pain management to improve his quality of life. The patient was explained the clinical decision making at great length. He'll be seen back in the future as needed.

## 2019-12-27 NOTE — HISTORY OF PRESENT ILLNESS
[de-identified] : Patient presents today for an initial evaluation of a painful bilateral knee replacements. He underwent initial surgery in April 2016 seen with Dr. Pantera Chong.  He reports doing well for the first 4 months. He states after 4 months,, his progress stopped and he developed swelling, pain and stiffness of both knees (left greater than right.) He continued physical therapy. Ultimately in October of 2017, he underwent a synovectomy of the left knee. He did not improve following the procedure. He was recommended a second opinion. He went no exudate. He saw Dr. Ram and had a polyethylene exchange with increased size of bearing. He reports that his condition has not improved with that procedure. He states the swelling when he stands for extended periods of time. He reports her pain to both anterior knees. The pain radiates proximally towards his hips. He is currently manages a spinal stimulator the lumbar spine for chronic back pain. He believes this pain is different. He has had extensive workup including knee aspiration advanced imaging. He reports that the implants are not loose and is not having infection. He's here today for further evaluation. He continues to have discomfort with increased activities. He has minimal pain at rest. No injuries or trauma is reported\par \par Review of Systems-\par Constitutional: No fever or chills. \par Cardiovascular: No orthopnea or chest pain\par Pulmonary: No shortness of breath. \par GI: No nausea or vomiting or abdominal pain.\par Musculoskeletal: see HPI \par Psychiatric: No anxiety and depression.

## 2019-12-27 NOTE — PHYSICAL EXAM
[de-identified] : The patient appears well nourished and in no apparent distress. The patient is alert and oriented to person, place, and time. Affect and mood appear normal. The head is normocephalic and atraumatic. The eyes reveal normal sclera and extra ocular muscles are intact. The mucous membranes are moist. Skin shows normal turgor with no evidence of eczema or psoriasis. No respiratory distress noted. Sensation grossly intact. MUSCULOSKELETAL:   SEE BELOW\par \par \par Bilateral knee exam demonstrates normal alignment. The exam demonstrates well-healed surgical scars consistent with past to an arthroplasty. No signs of acute trauma. Normal temperature. No erythema. No ecchymosis. Very minimal effusions bilaterally. Passive range of motion of both knees is zero to approximately 120° of flexion. No laxity in extension. No anterior or posterior laxity. No clicking with passive range of motion. No tenderness of the patella or medial compartments with palpation. Quadriceps and patellar tendons are both intact. No extensor mechanism lag. No flexion contracture. No calf tenderness. 2+ DP pulse. No venous stasis or ulceration. Normal sensation to light touch distally. [de-identified] : X-rays of both knees reviewed. Implants are in good position. No obvious signs of clinical loosening. No fractures

## 2020-12-04 ENCOUNTER — APPOINTMENT (OUTPATIENT)
Dept: DISASTER EMERGENCY | Facility: CLINIC | Age: 60
End: 2020-12-04

## 2020-12-04 DIAGNOSIS — Z01.818 ENCOUNTER FOR OTHER PREPROCEDURAL EXAMINATION: ICD-10-CM

## 2020-12-05 LAB — SARS-COV-2 N GENE NPH QL NAA+PROBE: NOT DETECTED

## 2020-12-07 ENCOUNTER — APPOINTMENT (OUTPATIENT)
Dept: ELECTROPHYSIOLOGY | Facility: CLINIC | Age: 60
End: 2020-12-07
Payer: COMMERCIAL

## 2020-12-07 VITALS
BODY MASS INDEX: 31.81 KG/M2 | HEART RATE: 70 BPM | TEMPERATURE: 97.4 F | SYSTOLIC BLOOD PRESSURE: 131 MMHG | WEIGHT: 240 LBS | DIASTOLIC BLOOD PRESSURE: 69 MMHG | OXYGEN SATURATION: 96 % | HEIGHT: 73 IN

## 2020-12-07 VITALS — DIASTOLIC BLOOD PRESSURE: 64 MMHG | SYSTOLIC BLOOD PRESSURE: 130 MMHG

## 2020-12-07 DIAGNOSIS — R42 DIZZINESS AND GIDDINESS: ICD-10-CM

## 2020-12-07 PROCEDURE — 99204 OFFICE O/P NEW MOD 45 MIN: CPT | Mod: 25

## 2020-12-07 PROCEDURE — 93000 ELECTROCARDIOGRAM COMPLETE: CPT | Mod: 59

## 2020-12-07 PROCEDURE — 99072 ADDL SUPL MATRL&STAF TM PHE: CPT

## 2020-12-07 RX ORDER — ASPIRIN 81 MG
81 TABLET, DELAYED RELEASE (ENTERIC COATED) ORAL
Refills: 0 | Status: ACTIVE | COMMUNITY

## 2020-12-07 RX ORDER — RIVAROXABAN 15 MG/1
15 TABLET, FILM COATED ORAL
Qty: 42 | Refills: 0 | Status: DISCONTINUED | COMMUNITY
Start: 2017-11-02 | End: 2020-12-07

## 2020-12-07 RX ORDER — RIVAROXABAN 20 MG/1
20 TABLET, FILM COATED ORAL DAILY
Qty: 90 | Refills: 0 | Status: DISCONTINUED | COMMUNITY
Start: 2017-11-13 | End: 2020-12-07

## 2020-12-07 RX ORDER — BISOPROLOL FUMARATE 10 MG/1
10 TABLET, FILM COATED ORAL DAILY
Refills: 0 | Status: ACTIVE | COMMUNITY
Start: 2020-12-07

## 2020-12-07 RX ORDER — DICLOFENAC SODIUM 100 MG/1
100 TABLET, FILM COATED, EXTENDED RELEASE ORAL
Qty: 30 | Refills: 0 | Status: DISCONTINUED | COMMUNITY
Start: 2017-09-18 | End: 2020-12-07

## 2020-12-07 RX ORDER — MECLIZINE HYDROCHLORIDE 25 MG/1
25 TABLET ORAL
Refills: 0 | Status: ACTIVE | COMMUNITY

## 2020-12-07 NOTE — PHYSICAL EXAM
[General Appearance - Well Developed] : well developed [Normal Appearance] : normal appearance [Well Groomed] : well groomed [General Appearance - Well Nourished] : well nourished [No Deformities] : no deformities [General Appearance - In No Acute Distress] : no acute distress [Normal Conjunctiva] : the conjunctiva exhibited no abnormalities [Eyelids - No Xanthelasma] : the eyelids demonstrated no xanthelasmas [Normal Oral Mucosa] : normal oral mucosa [No Oral Pallor] : no oral pallor [No Oral Cyanosis] : no oral cyanosis [Normal Jugular Venous A Waves Present] : normal jugular venous A waves present [Normal Jugular Venous V Waves Present] : normal jugular venous V waves present [No Jugular Venous Ross A Waves] : no jugular venous ross A waves [Heart Rate And Rhythm] : heart rate and rhythm were normal [Heart Sounds] : normal S1 and S2 [Murmurs] : no murmurs present [Respiration, Rhythm And Depth] : normal respiratory rhythm and effort [Exaggerated Use Of Accessory Muscles For Inspiration] : no accessory muscle use [Auscultation Breath Sounds / Voice Sounds] : lungs were clear to auscultation bilaterally [Abdomen Soft] : soft [Abdomen Tenderness] : non-tender [Abnormal Walk] : normal gait [Gait - Sufficient For Exercise Testing] : the gait was sufficient for exercise testing [Nail Clubbing] : no clubbing of the fingernails [Cyanosis, Localized] : no localized cyanosis [Petechial Hemorrhages (___cm)] : no petechial hemorrhages [Skin Color & Pigmentation] : normal skin color and pigmentation [] : no rash [No Venous Stasis] : no venous stasis [No Skin Ulcers] : no skin ulcer [Oriented To Time, Place, And Person] : oriented to person, place, and time [Affect] : the affect was normal [Mood] : the mood was normal [No Anxiety] : not feeling anxious

## 2020-12-07 NOTE — HISTORY OF PRESENT ILLNESS
[FreeTextEntry1] : This is a 59 years old man with PMH of HTN, HLD, dizziness, GERD, sleep apnea on CPAP, OA, s/p bilateral knee replacement and post op left calf DVT. \par \par He underwent a bilateral knee replacement in 4/2016, followed by left knee synovectomy in 10/2017. He was seen as an inpatient post his initial knee surgery and  underwent an ILR on 4/21/2016 to monitor for palpitation and presyncope. He was seen once in the office with Dr. Mosley in 8/2016, and did not follow up with EP since then. He was following with an outside cardiologist, and decided not to have home monitoring due to cost. He was told recently that his ILR is dead when his cardiologist tried to interrogate it. Patient stated that the ILR was not checked since August 2016.\par \par Since then, he has been feeling almost daily palpitation that only last for 1-2 seconds. He describes it as fluttering in his chest that he had for many years and now thinks it maybe related to his GERD. He has no associated CP, SOB, or dizziness. He could not identify any clear trigger. He denies sustained palpitation. He has rare postural dizziness, but had no syncope or presyncope since 2016. He mentioned that he only had one presyncope after his knee surgery while he was monitored as an inpatient and the cardiac workup was unremarkable.\par \par His main complain now is the bilateral knee pain. \par \par He denies any fevers, chills, cough, sweats, chest pain, palpitations, dyspnea on exertion, orthopnea, paroxysmal nocturnal dyspnea, peripheral edema, syncope, nausea, vomiting, melena, hematochezia, or hematemesis.\par \par \par \par TESTS:\par ECG now: Sinus with LVH. Poor R wave progression.   \par ILR check: Could not perform ILR check today as the device is out of battery. Last available ILR check was via [employer] from 8/2016 and showed no events. \par \par

## 2020-12-07 NOTE — ASSESSMENT
[FreeTextEntry1] : This is a 59 years old man with PMH of HTN, HLD, dizziness, GERD, sleep apnea on CPAP, OA, s/p bilateral knee replacement and post op left calf DVT. \par \par He underwent a bilateral knee replacement in 4/2016, and  underwent an ILR on 4/21/2016 to monitor for palpitation and presyncope. He was seen once in the office with Dr. Mosley in 8/2016, and did not follow up with EP since then. He was following with an outside cardiologist, and decided not to have home monitoring due to cost. He was told recently that his ILR is dead when his cardiologist tried to interrogate it. Patient stated that the ILR was not checked since August 2016.\par \par Since then, he has been feeling almost daily palpitation that only last for 1-2 seconds. He describes it as fluttering in his chest that he had for many years and now thinks it maybe related to his GERD. He has no associated CP, SOB, or dizziness. He could not identify any clear trigger. He denies sustained palpitation. He has rare postural dizziness, but had no syncope or presyncope since 2016. He mentioned that he only had one presyncope after his knee surgery while he was monitored as an inpatient and the cardiac workup was unremarkable.

## 2020-12-07 NOTE — DISCUSSION/SUMMARY
[FreeTextEntry1] : 1. Dizziness. Now only has mild postural dizziness. No syncope or presyncope. He is following with an outside cardiologist and told me his cardiac workup has been unremarkable.\par - No further testing is needed now. \par - Continue to follow up with his cardiologist\par - Counseled to stay well hydrated and stand up slowly. \par \par \par 2. Palpitation, No sustained palpitation or documented arrhythmia. He has no clear red flags like syncope ore recurrent presyncope.\par - Offered an ILR reimplant for monitoring but he was not interested. I think this is reasonable as his symptoms are very mild and may not be arrhythmia\par \par \par 3. ILR in place. Patient stopped the home monitoring few months after having this in place due to cost. He did not follow with EP and does not recall his ILR being checked at all. He was told lately that the device is out of batter by his cardiologist who attempted to check it recently. I could not check this today as its out of battery and last available interrogation is from 8/2016 and showed no events.\par - Will arrange for an ILR explant. Offered re-implant but patient was not interested and I think its reasonable not to reimplant as he has no recurrence of presyncope or sustained palpitation.   \par \par

## 2020-12-07 NOTE — REVIEW OF SYSTEMS
[see HPI] : see HPI [Negative] : Cardiovascular [Fever] : no fever [Headache] : no headache [Recent Weight Gain (___ Lbs)] : no recent weight gain [Chills] : no chills [Recent Weight Loss (___ Lbs)] : no recent weight loss [Blurry Vision] : no blurred vision [Eye Pain] : no eye pain [Earache] : no earache [Loss Of Hearing] : no hearing loss [Mouth Sores] : no mouth sores [Sore Throat] : no sore throat [Cough] : no cough [Wheezing] : no wheezing [Abdominal Pain] : no abdominal pain [Nausea] : no nausea [Vomiting] : no vomiting [Urinary Frequency] : no change in urinary frequency [Hematuria] : no hematuria [Muscle Cramps] : no muscle cramps [Limb Weakness (Paresis)] : no limb weakness [Skin: A Rash] : no rash: [Itching] : no itching [Tremor] : no tremor was seen [Numbness (Hypesthesia)] : no numbness [Convulsions] : no convulsions [Confusion] : no confusion was observed [Anxiety] : no anxiety [Excessive Thirst] : no polydipsia [Easy Bleeding] : no tendency for easy bleeding [Easy Bruising] : no tendency for easy bruising

## 2020-12-07 NOTE — REASON FOR VISIT
[Consultation] : a consultation regarding [Dizziness] : dizziness [Palpitations] : palpitations [FreeTextEntry1] : ILR in place

## 2020-12-08 ENCOUNTER — APPOINTMENT (OUTPATIENT)
Dept: PULMONOLOGY | Facility: CLINIC | Age: 60
End: 2020-12-08
Payer: COMMERCIAL

## 2020-12-08 VITALS
OXYGEN SATURATION: 97 % | SYSTOLIC BLOOD PRESSURE: 130 MMHG | RESPIRATION RATE: 16 BRPM | HEART RATE: 78 BPM | DIASTOLIC BLOOD PRESSURE: 68 MMHG

## 2020-12-08 VITALS — BODY MASS INDEX: 33.32 KG/M2 | HEIGHT: 72 IN | WEIGHT: 246 LBS

## 2020-12-08 DIAGNOSIS — Z01.811 ENCOUNTER FOR PREPROCEDURAL RESPIRATORY EXAMINATION: ICD-10-CM

## 2020-12-08 PROCEDURE — 94729 DIFFUSING CAPACITY: CPT

## 2020-12-08 PROCEDURE — 85018 HEMOGLOBIN: CPT | Mod: QW

## 2020-12-08 PROCEDURE — 99204 OFFICE O/P NEW MOD 45 MIN: CPT | Mod: 25

## 2020-12-08 PROCEDURE — 94727 GAS DIL/WSHOT DETER LNG VOL: CPT

## 2020-12-08 PROCEDURE — 94010 BREATHING CAPACITY TEST: CPT

## 2020-12-08 PROCEDURE — 99072 ADDL SUPL MATRL&STAF TM PHE: CPT

## 2020-12-08 NOTE — REASON FOR VISIT
[Initial] : an initial visit [Sleep Apnea] : sleep apnea [Shortness of Breath] : shortness of breath [Pre-op Risk Stratification] : pre-op risk stratification [Obesity] : obesity

## 2020-12-09 ENCOUNTER — OUTPATIENT (OUTPATIENT)
Dept: OUTPATIENT SERVICES | Facility: HOSPITAL | Age: 60
LOS: 1 days | End: 2020-12-09
Payer: COMMERCIAL

## 2020-12-09 ENCOUNTER — APPOINTMENT (OUTPATIENT)
Dept: RADIOLOGY | Facility: CLINIC | Age: 60
End: 2020-12-09
Payer: COMMERCIAL

## 2020-12-09 DIAGNOSIS — Z98.890 OTHER SPECIFIED POSTPROCEDURAL STATES: Chronic | ICD-10-CM

## 2020-12-09 DIAGNOSIS — Z98.89 OTHER SPECIFIED POSTPROCEDURAL STATES: Chronic | ICD-10-CM

## 2020-12-09 DIAGNOSIS — Z01.811 ENCOUNTER FOR PREPROCEDURAL RESPIRATORY EXAMINATION: ICD-10-CM

## 2020-12-09 DIAGNOSIS — R06.02 SHORTNESS OF BREATH: ICD-10-CM

## 2020-12-09 DIAGNOSIS — Z96.653 PRESENCE OF ARTIFICIAL KNEE JOINT, BILATERAL: Chronic | ICD-10-CM

## 2020-12-09 DIAGNOSIS — Z90.89 ACQUIRED ABSENCE OF OTHER ORGANS: Chronic | ICD-10-CM

## 2020-12-09 PROCEDURE — 71046 X-RAY EXAM CHEST 2 VIEWS: CPT

## 2020-12-09 PROCEDURE — 71046 X-RAY EXAM CHEST 2 VIEWS: CPT | Mod: 26

## 2020-12-09 NOTE — DISCUSSION/SUMMARY
[FreeTextEntry1] : \par #1. PFTs performed today are essentially normal.\par #2. The patient does not appear to require chronic BD therapy at this time\par #3. SOBOE is likely related to weight or deconditioning given normal PFTs\par #4. Diet and exercise for weight loss\par #5. Continue autoCPAP to treat mild MASOOD with an AHI of 14\par #6. Replace equipment as needed; ordered 12/8/20\par #7. Preop CXR\par #8. Provided preop CXR is unremarkable, pt would be able to proceed with his lipoma removal without any pulmonary contraindication to GA if needed. I would recommend post op incentive spirometry, GI/DVT prophylaxis as needed, early ambulation as able, and adequate pain control. Would recommend that O2 saturation should be monitored during and after the procedure. I would also recommend that patient's autoCPAP be available to him during surgery if GA is not used and should be available post-op as needed. Alternatively pt could use empiric CPAP at 10 cm of water.\par #9. F/u in 4-6 months\par #10. Reviewed risks of exposure and symptoms of Covid-19 virus, including how the virus is spread and precautions to avoid sonia virus.\par \par Patient's questions were answered and patient appears to understand these recommendations\par \par Addendum 12/9/20: CXR from 12/9/20 was clear so pt would able to proceed with his procedure with the above recommendations.

## 2020-12-09 NOTE — CONSULT LETTER
[Dear  ___] : Dear  [unfilled], [Consult Letter:] : I had the pleasure of evaluating your patient, [unfilled]. [Please see my note below.] : Please see my note below. [Consult Closing:] : Thank you very much for allowing me to participate in the care of this patient.  If you have any questions, please do not hesitate to contact me. [Sincerely,] : Sincerely, [DrCosme  ___] : Dr. ROLLE [FreeTextEntry3] : Alton Montgomery MD, FCCP, D. ABSM\par Pulmonary and Sleep Medicine\par [facility] [place]

## 2020-12-09 NOTE — PHYSICAL EXAM
[No Acute Distress] : no acute distress [Well Nourished] : well nourished [Well Developed] : well developed [Normal Appearance] : normal appearance [Supple] : supple [Normal Rate/Rhythm] : normal rate/rhythm [Normal S1, S2] : normal s1, s2 [No Edema] : no edema [No Murmurs] : no murmurs [TextBox_2] : Pt is obese

## 2020-12-09 NOTE — REVIEW OF SYSTEMS
[Dry Eyes] : dry eyes [Postnasal Drip] : postnasal drip [SOB on Exertion] : sob on exertion [Seasonal Allergies] : seasonal allergies [GERD] : gerd [Back Pain] : back pain [Fever] : no fever [Chills] : no chills [Epistaxis] : no epistaxis [Eye Irritation] : no eye irritation [Nasal Congestion] : no nasal congestion [Sinus Problems] : no sinus problems [Cough] : no cough [Chest Tightness] : no chest tightness [Sputum] : no sputum [Dyspnea] : no dyspnea [Pleuritic Pain] : no pleuritic pain [Wheezing] : no wheezing [Chest Discomfort] : no chest discomfort [Edema] : no edema [Palpitations] : no palpitations [Syncope] : no syncope [Hay Fever] : no hay fever [Itchy Eyes] : no itchy eyes [Abdominal Pain] : no abdominal pain [Nausea] : no nausea [Vomiting] : no vomiting [Diarrhea] : no diarrhea [Constipation] : no constipation [Anemia] : no anemia

## 2020-12-09 NOTE — HISTORY OF PRESENT ILLNESS
[Never] : never [Current] : current [Initial Evaluation - Existing Diagnosis] : an initial evaluation of an existing diagnosis of [Good Compliance] : good compliance with treatment [Good Tolerance] : good tolerance of treatment [Good Symptom Control] : good symptom control [Initial Eval - Existing Diagnosis] : an initial evaluation of an existing diagnosis of [Excess Weight] : excess weight [Currently Experiencing] : The patient is currently experiencing symptoms. [Dyspnea] : dyspnea [Knee Pain] : knee pain [Back Pain] : back pain [Low Calorie Diet] : low calorie diet [Fair Compliance] : fair compliance with treatment [Fair Tolerance] : fair tolerance of treatment [Poor Symptom Control] : poor symptom control [Sleep Apnea] : sleep apnea [Hypertension] : hypertension [High] : high [Low Calorie] : low calorie [Well Balanced Diet] : well balanced meals [___ Times/Week] : exercises [unfilled] times per week [Aerobic Conditioning] : aerobic conditioning [On ___] : performed on [unfilled] [Patient] : the patient [To Assess ___] : to assess [unfilled] [None] : no new symptoms reported [TextBox_4] : The patient is a never smoker but uses marijuana daily.\par He presents for preop evaluation for lipoma removal. [TextBox_27] : fairly daily [de-identified] : autoCPAP [FreeTextEntry9] : Chest CT [FreeTextEntry8] : Stable nodules going back to 2013

## 2021-01-05 ENCOUNTER — APPOINTMENT (OUTPATIENT)
Dept: DISASTER EMERGENCY | Facility: CLINIC | Age: 61
End: 2021-01-05

## 2021-01-22 ENCOUNTER — TRANSCRIPTION ENCOUNTER (OUTPATIENT)
Age: 61
End: 2021-01-22

## 2021-01-22 ENCOUNTER — EMERGENCY (EMERGENCY)
Facility: HOSPITAL | Age: 61
LOS: 1 days | Discharge: HOME | End: 2021-01-22
Payer: COMMERCIAL

## 2021-01-22 VITALS
SYSTOLIC BLOOD PRESSURE: 163 MMHG | OXYGEN SATURATION: 97 % | DIASTOLIC BLOOD PRESSURE: 89 MMHG | TEMPERATURE: 98 F | HEIGHT: 74 IN | RESPIRATION RATE: 16 BRPM | HEART RATE: 56 BPM

## 2021-01-22 DIAGNOSIS — Z98.890 OTHER SPECIFIED POSTPROCEDURAL STATES: Chronic | ICD-10-CM

## 2021-01-22 DIAGNOSIS — Z90.89 ACQUIRED ABSENCE OF OTHER ORGANS: Chronic | ICD-10-CM

## 2021-01-22 DIAGNOSIS — Z96.653 PRESENCE OF ARTIFICIAL KNEE JOINT, BILATERAL: Chronic | ICD-10-CM

## 2021-01-22 DIAGNOSIS — Z98.89 OTHER SPECIFIED POSTPROCEDURAL STATES: Chronic | ICD-10-CM

## 2021-01-22 LAB — SARS-COV-2 RNA SPEC QL NAA+PROBE: SIGNIFICANT CHANGE UP

## 2021-01-22 PROCEDURE — 99283 EMERGENCY DEPT VISIT LOW MDM: CPT

## 2021-01-22 PROCEDURE — U0005: CPT

## 2021-01-22 PROCEDURE — U0003: CPT

## 2021-01-22 NOTE — ED PROVIDER NOTE - PATIENT PORTAL LINK FT
You can access the FollowMyHealth Patient Portal offered by [facility] by registering at the following website: [email or web address]. By joining [employer]’s FollowMyHealth portal, you will also be able to view your health information using other applications (apps) compatible with our system.

## 2021-01-22 NOTE — ED PROVIDER NOTE - OBJECTIVE STATEMENT
PT presents for COVID testing. Admits to covid exposure last saturday. Denies any symptoms. (+) COVID contact reported.

## 2021-05-03 ENCOUNTER — APPOINTMENT (OUTPATIENT)
Dept: PULMONOLOGY | Facility: CLINIC | Age: 61
End: 2021-05-03
Payer: COMMERCIAL

## 2021-05-03 VITALS
RESPIRATION RATE: 16 BRPM | WEIGHT: 234 LBS | SYSTOLIC BLOOD PRESSURE: 120 MMHG | HEART RATE: 54 BPM | OXYGEN SATURATION: 96 % | DIASTOLIC BLOOD PRESSURE: 60 MMHG | BODY MASS INDEX: 31.74 KG/M2

## 2021-05-03 PROCEDURE — 99072 ADDL SUPL MATRL&STAF TM PHE: CPT

## 2021-05-03 PROCEDURE — 99214 OFFICE O/P EST MOD 30 MIN: CPT

## 2021-05-03 NOTE — REASON FOR VISIT
[Follow-Up] : a follow-up visit [Sleep Apnea] : sleep apnea [Shortness of Breath] : shortness of breath [Pre-op Risk Stratification] : pre-op risk stratification [Obesity] : obesity

## 2021-05-03 NOTE — DISCUSSION/SUMMARY
[FreeTextEntry1] : \par #1. PFTs performed previously essentially normal.\par #2. The patient does not appear to require chronic BD therapy at this time; Albuterol inhaler as needed\par #3. SOBOE is likely related to weight or deconditioning given normal PFTs\par #4. Diet and exercise for weight loss\par #5. Continue autoCPAP to treat mild MASOOD with an AHI of 14\par #6. Replace equipment as needed; ordered 12/8/20\par #7. Prior CXR from 12/9/20 was clear\par #8. S/p both Covid vaccines\par #9. F/u in 6 months\par #10. Could consider further w/u for asthma with MCT if desired by pt for occasional SOB and wheeze but he would like to hold off for now and will use his Albuterol inhaler as needed but rarely requires\par #11. Reviewed risks of exposure and symptoms of Covid-19 virus, including how the virus is spread and precautions to avoid sonia virus.\par \par Patient's questions were answered and patient appears to understand these recommendations

## 2021-05-03 NOTE — HISTORY OF PRESENT ILLNESS
[Never] : never [Current] : current [Good Compliance] : good compliance with treatment [Good Tolerance] : good tolerance of treatment [Good Symptom Control] : good symptom control [Follow-Up - Routine Clinic] : a routine clinic follow-up of [Excess Weight] : excess weight [Currently Experiencing] : The patient is currently experiencing symptoms. [Dyspnea] : dyspnea [Knee Pain] : knee pain [Back Pain] : back pain [Low Calorie Diet] : low calorie diet [Fair Compliance] : fair compliance with treatment [Fair Tolerance] : fair tolerance of treatment [Poor Symptom Control] : poor symptom control [Sleep Apnea] : sleep apnea [Hypertension] : hypertension [High] : high [Low Calorie] : low calorie [Well Balanced Diet] : well balanced meals [___ Times/Week] : exercises [unfilled] times per week [Aerobic Conditioning] : aerobic conditioning [On ___] : performed on [unfilled] [Patient] : the patient [To Assess ___] : to assess [unfilled] [None] : no new symptoms reported [TextBox_4] : The patient is a never smoker but uses marijuana daily.\par He presents for preop evaluation for lipoma removal.\par The patient has a loop recorder. [TextBox_27] : fairly daily [de-identified] : autoCPAP [FreeTextEntry9] : Chest CT [FreeTextEntry8] : Stable nodules going back to 2013

## 2021-05-03 NOTE — PHYSICAL EXAM
[No Acute Distress] : no acute distress [Well Nourished] : well nourished [Well Developed] : well developed [Normal Appearance] : normal appearance [Supple] : supple [Normal Rate/Rhythm] : normal rate/rhythm [Normal S1, S2] : normal s1, s2 [No Murmurs] : no murmurs [No Resp Distress] : no resp distress [No Acc Muscle Use] : no acc muscle use [Normal Rhythm and Effort] : normal rhythm and effort [Clear to Auscultation Bilaterally] : clear to auscultation bilaterally [No Abnormalities] : no abnormalities [Benign] : benign [Not Tender] : not tender [Soft] : soft [No Clubbing] : no clubbing [No Edema] : no edema [No Focal Deficits] : no focal deficits [Oriented x3] : oriented x3 [TextBox_2] : Pt is obese

## 2021-05-03 NOTE — CONSULT LETTER
[Dear  ___] : Dear  [unfilled], [Consult Letter:] : I had the pleasure of evaluating your patient, [unfilled]. [Please see my note below.] : Please see my note below. [Consult Closing:] : Thank you very much for allowing me to participate in the care of this patient.  If you have any questions, please do not hesitate to contact me. [Sincerely,] : Sincerely, [FreeTextEntry3] : Alton Montgomery MD, FCCP, D. ABSM\par Pulmonary and Sleep Medicine\par [facility] [place]

## 2021-07-21 ENCOUNTER — EMERGENCY (EMERGENCY)
Facility: HOSPITAL | Age: 61
LOS: 1 days | Discharge: HOME | End: 2021-07-21
Attending: EMERGENCY MEDICINE
Payer: COMMERCIAL

## 2021-07-21 VITALS
DIASTOLIC BLOOD PRESSURE: 96 MMHG | OXYGEN SATURATION: 97 % | WEIGHT: 229.94 LBS | RESPIRATION RATE: 18 BRPM | SYSTOLIC BLOOD PRESSURE: 147 MMHG | HEART RATE: 50 BPM | TEMPERATURE: 99 F | HEIGHT: 74 IN

## 2021-07-21 DIAGNOSIS — Z98.890 OTHER SPECIFIED POSTPROCEDURAL STATES: Chronic | ICD-10-CM

## 2021-07-21 DIAGNOSIS — Z96.653 PRESENCE OF ARTIFICIAL KNEE JOINT, BILATERAL: Chronic | ICD-10-CM

## 2021-07-21 DIAGNOSIS — Z90.89 ACQUIRED ABSENCE OF OTHER ORGANS: Chronic | ICD-10-CM

## 2021-07-21 DIAGNOSIS — Z98.89 OTHER SPECIFIED POSTPROCEDURAL STATES: Chronic | ICD-10-CM

## 2021-07-21 LAB — SARS-COV-2 RNA SPEC QL NAA+PROBE: SIGNIFICANT CHANGE UP

## 2021-07-21 PROCEDURE — U0003: CPT

## 2021-07-21 PROCEDURE — U0005: CPT

## 2021-07-21 PROCEDURE — 99282 EMERGENCY DEPT VISIT SF MDM: CPT

## 2021-07-21 PROCEDURE — 99283 EMERGENCY DEPT VISIT LOW MDM: CPT

## 2021-07-21 NOTE — ED STATDOCS - OBJECTIVE STATEMENT
59 y/o male with presents to the ED for COVID test for after being in contact with people who were in contact with somebody who had Covid. Pt denies fevers, chills, cough, SOB.

## 2021-07-21 NOTE — ED STATDOCS - PATIENT PORTAL LINK FT
You can access the FollowMyHealth Patient Portal offered by [facility] by registering at the following website: [email or web address]. By joining [employer]’s FollowMyHealth portal, you will also be able to view your health information using other applications (apps) compatible with our system.

## 2021-07-21 NOTE — ED ADULT TRIAGE NOTE - CHIEF COMPLAINT QUOTE
Pt requesting COVID test for possible exposure.  Pt denies any exposure/sign/symptoms.
PAIN SCALE 0 OF 10.

## 2021-09-10 ENCOUNTER — EMERGENCY (EMERGENCY)
Facility: HOSPITAL | Age: 61
LOS: 1 days | Discharge: HOME | End: 2021-09-10
Payer: COMMERCIAL

## 2021-09-10 VITALS
DIASTOLIC BLOOD PRESSURE: 92 MMHG | TEMPERATURE: 99 F | RESPIRATION RATE: 18 BRPM | HEIGHT: 74 IN | HEART RATE: 71 BPM | WEIGHT: 235.01 LBS | OXYGEN SATURATION: 94 % | SYSTOLIC BLOOD PRESSURE: 156 MMHG

## 2021-09-10 DIAGNOSIS — Z96.653 PRESENCE OF ARTIFICIAL KNEE JOINT, BILATERAL: Chronic | ICD-10-CM

## 2021-09-10 DIAGNOSIS — Z98.890 OTHER SPECIFIED POSTPROCEDURAL STATES: Chronic | ICD-10-CM

## 2021-09-10 DIAGNOSIS — Z90.89 ACQUIRED ABSENCE OF OTHER ORGANS: Chronic | ICD-10-CM

## 2021-09-10 DIAGNOSIS — Z98.89 OTHER SPECIFIED POSTPROCEDURAL STATES: Chronic | ICD-10-CM

## 2021-09-10 PROCEDURE — U0005: CPT

## 2021-09-10 PROCEDURE — 99283 EMERGENCY DEPT VISIT LOW MDM: CPT

## 2021-09-10 PROCEDURE — U0003: CPT

## 2021-09-10 PROCEDURE — 99282 EMERGENCY DEPT VISIT SF MDM: CPT

## 2021-09-11 LAB — SARS-COV-2 RNA SPEC QL NAA+PROBE: DETECTED

## 2021-09-17 NOTE — ED PROVIDER NOTE - PATIENT PORTAL LINK FT
You can access the FollowMyHealth Patient Portal offered by [facility] by registering at the following website: [email or web address]. By joining [employer]’s FollowMyHealth portal, you will also be able to view your health information using other applications (apps) compatible with our system.

## 2021-11-01 ENCOUNTER — APPOINTMENT (OUTPATIENT)
Dept: PULMONOLOGY | Facility: CLINIC | Age: 61
End: 2021-11-01

## 2021-12-13 ENCOUNTER — APPOINTMENT (OUTPATIENT)
Dept: ELECTROPHYSIOLOGY | Facility: CLINIC | Age: 61
End: 2021-12-13
Payer: MEDICARE

## 2021-12-13 VITALS
HEART RATE: 51 BPM | RESPIRATION RATE: 17 BRPM | TEMPERATURE: 98 F | HEIGHT: 72 IN | DIASTOLIC BLOOD PRESSURE: 80 MMHG | OXYGEN SATURATION: 96 % | SYSTOLIC BLOOD PRESSURE: 142 MMHG | WEIGHT: 240 LBS | BODY MASS INDEX: 32.51 KG/M2

## 2021-12-13 PROCEDURE — 93000 ELECTROCARDIOGRAM COMPLETE: CPT

## 2021-12-13 PROCEDURE — 99213 OFFICE O/P EST LOW 20 MIN: CPT

## 2021-12-13 RX ORDER — MELOXICAM 15 MG/1
15 TABLET ORAL DAILY
Refills: 0 | Status: DISCONTINUED | COMMUNITY
Start: 2020-12-07 | End: 2021-12-13

## 2021-12-15 NOTE — HISTORY OF PRESENT ILLNESS
[FreeTextEntry1] : This is a 61 years old man with PMH of HTN, HLD, dizziness, GERD, sleep apnea on CPAP, OA, s/p bilateral knee replacement and post op left calf DVT. \par \par He underwent a bilateral knee replacement in 4/2016, followed by left knee synovectomy in 10/2017. He was seen as an inpatient post his initial knee surgery and  underwent an ILR on 4/21/2016 to monitor for palpitation and presyncope. He was seen once in the office with Dr. Mosley in 8/2016, and did not follow up with EP since then. He was following with an outside cardiologist, and decided not to have home monitoring due to cost. He was told recently that his ILR is dead when his cardiologist tried to interrogate it. Patient stated that the ILR was not checked since August 2016.\par \par Since then, he has been feeling almost daily palpitation that only last for 1-2 seconds. He describes it as fluttering in his chest that he had for many years and now thinks it maybe related to his GERD. He has no associated CP, SOB, or dizziness. He could not identify any clear trigger. He denies sustained palpitation. He has rare postural dizziness, but had no syncope or presyncope since 2016. He mentioned that he only had one presyncope after his knee surgery while he was monitored as an inpatient and the cardiac workup was unremarkable.\par \par His main complain now is the bilateral knee pain. \par \par Above from initial consult on 12/2020. At that time, we planned on ILR explant. He cancelled it due to insurance issues and did not follow up since then. Now he came for a follow up. He c/o stable mild DUVAL, and GERD. He had worsening GERD symptoms and went to [facility]. Was worked up there and told no cardiac concerns. He had no more palpitation, syncope or presyncope. \par \par He denies any fevers, chills, cough, sweats, chest pain, palpitations, dyspnea on exertion, orthopnea, paroxysmal nocturnal dyspnea, peripheral edema, syncope, nausea, vomiting, melena, hematochezia, or hematemesis.\par \par \par TESTS:\par EKG today: SB at 54 bpm, otherwise normal. \par ECG 12/2020: Sinus with LVH. Poor R wave progression.   \par ILR check: Could not perform ILR check today as the device is out of battery. Last available ILR check was via [facility] from 8/2016 and showed no events. \par \par

## 2021-12-15 NOTE — ASSESSMENT
[FreeTextEntry1] : This is a 61 years old man with PMH of HTN, HLD, dizziness, GERD, sleep apnea on CPAP, OA, s/p bilateral knee replacement and post op left calf DVT. \par \par He underwent a bilateral knee replacement in 4/2016, followed by left knee synovectomy in 10/2017. He was seen as an inpatient post his initial knee surgery and  underwent an ILR on 4/21/2016 to monitor for palpitation and presyncope. He was seen once in the office with Dr. Mosley in 8/2016, and did not follow up with EP since then. He was following with an outside cardiologist, and decided not to have home monitoring due to cost. He was told recently that his ILR is dead when his cardiologist tried to interrogate it. Patient stated that the ILR was not checked since August 2016.\par \par Since then, he has been feeling almost daily palpitation that only last for 1-2 seconds. He describes it as fluttering in his chest that he had for many years and now thinks it maybe related to his GERD. He has no associated CP, SOB, or dizziness. He could not identify any clear trigger. He denies sustained palpitation. He has rare postural dizziness, but had no syncope or presyncope since 2016. He mentioned that he only had one presyncope after his knee surgery while he was monitored as an inpatient and the cardiac workup was unremarkable.\par \par His main complain now is the bilateral knee pain. \par \par Above from initial consult on 12/2020. At that time, we planned on ILR explant. He cancelled it due to insurance issues and did not follow up since then. Now he came for a follow up. He c/o stable mild DUVAL, and GERD. He had worsening GERD symptoms and went to [facility]. Was worked up there and told no cardiac concerns. He had no more palpitation, syncope or presyncope.

## 2021-12-15 NOTE — REVIEW OF SYSTEMS
[Fever] : no fever [Chills] : no chills [SOB] : no shortness of breath [Dyspnea on exertion] : dyspnea during exertion [Chest Discomfort] : no chest discomfort [Lower Ext Edema] : no extremity edema [Leg Claudication] : no intermittent leg claudication [Palpitations] : no palpitations [Orthopnea] : no orthopnea [PND] : no PND [Syncope] : no syncope [Cough] : no cough [Wheezing] : no wheezing [Confusion] : no confusion was observed

## 2021-12-15 NOTE — DISCUSSION/SUMMARY
[FreeTextEntry1] : ILR in place and reached end of life. cannot be interrogated. planned explant last year and he cancelled it. Back now for ILR explant. Still does not want re-implant. No recurrence of dizziness or palpitation. \par - Will scheduled for ILR explant. No further EP issues. \par - Will try to get his cardiologist records. \par \par \par

## 2021-12-15 NOTE — REASON FOR VISIT
[Follow-Up - Clinic] : a clinic follow-up of [Dizziness] : dizziness [Palpitations] : palpitations [FreeTextEntry1] : ILR in place

## 2022-01-03 ENCOUNTER — APPOINTMENT (OUTPATIENT)
Dept: DISASTER EMERGENCY | Facility: CLINIC | Age: 62
End: 2022-01-03

## 2022-01-03 DIAGNOSIS — Z01.818 ENCOUNTER FOR OTHER PREPROCEDURAL EXAMINATION: ICD-10-CM

## 2022-01-04 ENCOUNTER — NON-APPOINTMENT (OUTPATIENT)
Age: 62
End: 2022-01-04

## 2022-01-05 LAB — SARS-COV-2 N GENE NPH QL NAA+PROBE: NOT DETECTED

## 2022-01-06 ENCOUNTER — OUTPATIENT (OUTPATIENT)
Dept: OUTPATIENT SERVICES | Facility: HOSPITAL | Age: 62
LOS: 1 days | End: 2022-01-06
Payer: MEDICARE

## 2022-01-06 ENCOUNTER — TRANSCRIPTION ENCOUNTER (OUTPATIENT)
Age: 62
End: 2022-01-06

## 2022-01-06 DIAGNOSIS — Z98.890 OTHER SPECIFIED POSTPROCEDURAL STATES: Chronic | ICD-10-CM

## 2022-01-06 DIAGNOSIS — Z98.89 OTHER SPECIFIED POSTPROCEDURAL STATES: Chronic | ICD-10-CM

## 2022-01-06 DIAGNOSIS — Z96.653 PRESENCE OF ARTIFICIAL KNEE JOINT, BILATERAL: Chronic | ICD-10-CM

## 2022-01-06 DIAGNOSIS — Z90.89 ACQUIRED ABSENCE OF OTHER ORGANS: Chronic | ICD-10-CM

## 2022-01-06 PROCEDURE — 33286 RMVL SUBQ CAR RHYTHM MNTR: CPT

## 2022-01-06 RX ORDER — HYDRALAZINE HCL 50 MG
1 TABLET ORAL
Qty: 0 | Refills: 0 | DISCHARGE

## 2022-01-06 RX ORDER — ALBUTEROL 90 UG/1
2 AEROSOL, METERED ORAL
Qty: 0 | Refills: 0 | DISCHARGE

## 2022-01-06 RX ORDER — ASPIRIN/CALCIUM CARB/MAGNESIUM 324 MG
1 TABLET ORAL
Qty: 0 | Refills: 0 | DISCHARGE

## 2022-01-06 RX ORDER — PANTOPRAZOLE SODIUM 20 MG/1
1 TABLET, DELAYED RELEASE ORAL
Qty: 0 | Refills: 0 | DISCHARGE

## 2022-01-06 RX ORDER — DICLOFENAC SODIUM 75 MG/1
1 TABLET, DELAYED RELEASE ORAL
Qty: 0 | Refills: 0 | DISCHARGE

## 2022-01-10 DIAGNOSIS — Z95.818 PRESENCE OF OTHER CARDIAC IMPLANTS AND GRAFTS: ICD-10-CM

## 2022-01-20 ENCOUNTER — RESULT CHARGE (OUTPATIENT)
Age: 62
End: 2022-01-20

## 2022-01-21 ENCOUNTER — NON-APPOINTMENT (OUTPATIENT)
Age: 62
End: 2022-01-21

## 2022-01-21 ENCOUNTER — APPOINTMENT (OUTPATIENT)
Dept: ELECTROPHYSIOLOGY | Facility: CLINIC | Age: 62
End: 2022-01-21
Payer: MEDICARE

## 2022-01-21 VITALS
DIASTOLIC BLOOD PRESSURE: 76 MMHG | TEMPERATURE: 97.5 F | BODY MASS INDEX: 32.51 KG/M2 | SYSTOLIC BLOOD PRESSURE: 124 MMHG | OXYGEN SATURATION: 95 % | WEIGHT: 240 LBS | HEIGHT: 72 IN | HEART RATE: 53 BPM

## 2022-01-21 DIAGNOSIS — R00.2 PALPITATIONS: ICD-10-CM

## 2022-01-21 DIAGNOSIS — Z95.818 PRESENCE OF OTHER CARDIAC IMPLANTS AND GRAFTS: ICD-10-CM

## 2022-01-21 PROCEDURE — 93000 ELECTROCARDIOGRAM COMPLETE: CPT

## 2022-01-21 PROCEDURE — 99213 OFFICE O/P EST LOW 20 MIN: CPT

## 2022-01-21 NOTE — DISCUSSION/SUMMARY
[FreeTextEntry1] : 61 years old man with PMH of HTN, HLD, GERD, sleep apnea on CPAP, OA s/p bilateral knee replacement and post op left calf DVT. He underwent a bilateral knee replacement in 4/2016, followed by left knee synovectomy in 10/2017. He was seen as an inpatient post his initial knee surgery and underwent an ILR on 4/21/2016 to monitor for palpitation and presyncope. He was seen once in the office with Dr. Mosley in 8/2016, and did not follow up with EP since then. He was following with an outside cardiologist, and decided not to have home monitoring due to cost. He was told recently that his ILR is dead when his cardiologist tried to interrogate it. Patient stated that the ILR was not checked since August 2016.\par He saw me on 12/2020 for ILR removal. We booked it but he postponed it and came back to see me on 12/2021 again for ILR removal. He denies sustained palpitation, syncope or recent presyncope. We discussed ILR re-implant which he declined. ILR check: Could not perform ILR check today as the device is out of battery. \par \par Last available ILR check was via [facility] from 8/2016 and showed no events.\par Now s/p ILR explant. Overall doing well and denies CP, SOB, DUVAL, dizziness, palpitations, syncope or presyncope.\par \par - ILR explant site is healing well without evidence of infection\par - EP f/up as needed\par \par Kalie Zarate PAC\par

## 2022-01-21 NOTE — HISTORY OF PRESENT ILLNESS
[FreeTextEntry1] : 61 years old man with PMH of HTN, HLD, GERD, sleep apnea on CPAP, OA s/p bilateral knee replacement and post op left calf DVT. He underwent a bilateral knee replacement in 4/2016, followed by left knee synovectomy in 10/2017. He was seen as an inpatient post his initial knee surgery and underwent an ILR on 4/21/2016 to monitor for palpitation and presyncope. He was seen once in the office with Dr. Mosley in 8/2016, and did not follow up with EP since then. He was following with an outside cardiologist, and decided not to have home monitoring due to cost. He was told recently that his ILR is dead when his cardiologist tried to interrogate it. Patient stated that the ILR was not checked since August 2016.\par He saw me on 12/2020 for ILR removal. We booked it but he postponed it and came back to see me on 12/2021 again for ILR removal. He denies sustained palpitation, syncope or recent presyncope. We discussed ILR re-implant which he declined. ILR check: Could not perform ILR check today as the device is out of battery. \par \par Last available ILR check was via [facility] from 8/2016 and showed no events.\par Now s/p ILR explant. Overall doing well and denies CP, SOB, DUVAL, dizziness, palpitations, syncope or presyncope.\par \par

## 2022-06-16 ENCOUNTER — APPOINTMENT (OUTPATIENT)
Dept: ORTHOPEDIC SURGERY | Facility: CLINIC | Age: 62
End: 2022-06-16
Payer: MEDICARE

## 2022-06-16 ENCOUNTER — RESULT CHARGE (OUTPATIENT)
Age: 62
End: 2022-06-16

## 2022-06-16 VITALS — WEIGHT: 238 LBS | HEIGHT: 72 IN | BODY MASS INDEX: 32.23 KG/M2

## 2022-06-16 PROCEDURE — 20611 DRAIN/INJ JOINT/BURSA W/US: CPT | Mod: 50

## 2022-06-16 PROCEDURE — 99214 OFFICE O/P EST MOD 30 MIN: CPT | Mod: 25

## 2022-06-16 PROCEDURE — 73030 X-RAY EXAM OF SHOULDER: CPT | Mod: 50,76

## 2022-06-16 NOTE — PROCEDURE
[Large Joint Injection] : Large joint injection [Bilateral] : bilaterally of the [Glenohumeral Joint] : glenohumeral joint [Pain] : pain [Inflammation] : inflammation [X-ray evidence of Osteoarthritis on this or prior visit] : x-ray evidence of Osteoarthritis on this or prior visit [Betadine] : betadine [Euflexxa] : Euflexxa [#1] : series #1 [] : Patient tolerated procedure well [Call if redness, pain or fever occur] : call if redness, pain or fever occur [Apply ice for 15min out of every hour for the next 12-24 hours as tolerated] : apply ice for 15 minutes out of every hour for the next 12-24 hours as tolerated [Previous OTC use and PT nontherapeutic] : patient has tried OTC's including aspirin, Ibuprofen, Aleve, etc or prescription NSAIDS, and/or exercises at home and/or physical therapy without satisfactory response [Patient had decreased mobility in the joint] : patient had decreased mobility in the joint [Risks, benefits, alternatives discussed / Verbal consent obtained] : the risks benefits, and alternatives have been discussed, and verbal consent was obtained [All ultrasound images have been permanently captured and stored accordingly in our picture archiving and communication system] : All ultrasound images have been permanently captured and stored accordingly in our picture archiving and communication system [Visualization of the needle and placement of injection was performed without complication] : visualization of the needle and placement of injection was performed without complication

## 2022-06-23 ENCOUNTER — APPOINTMENT (OUTPATIENT)
Dept: ORTHOPEDIC SURGERY | Facility: CLINIC | Age: 62
End: 2022-06-23

## 2022-06-23 VITALS — HEIGHT: 72 IN | WEIGHT: 238 LBS | BODY MASS INDEX: 32.23 KG/M2

## 2022-06-23 PROCEDURE — 20611 DRAIN/INJ JOINT/BURSA W/US: CPT | Mod: 50

## 2022-06-23 NOTE — HISTORY OF PRESENT ILLNESS
[de-identified] : The patient is here for his second of three bilateral lubricating injections. Last visit the patient was advised he is a candidate for a right total shoulder arthroplasty. He would like to hold off on having the procedure for as long as he can. He had bilateral knee arthroplasties done in 2016 that failed, he required multiple surgeries including revisions therefore he is apprehensive about having the surgery. The patient would like to have cortisone injections done in both shoulders. He states he has had several in both shoulders far more than 3 in each. The patient states he has full ROM, his complaint is circumferential pain worse with range of motion.

## 2022-06-23 NOTE — PROCEDURE
[Large Joint Injection] : Large joint injection [Bilateral] : bilaterally of the [Glenohumeral Joint] : glenohumeral joint [Pain] : pain [Inflammation] : inflammation [X-ray evidence of Osteoarthritis on this or prior visit] : x-ray evidence of Osteoarthritis on this or prior visit [Betadine] : betadine [Euflexxa] : Euflexxa [#2] : series #2 [] : Patient tolerated procedure well [Call if redness, pain or fever occur] : call if redness, pain or fever occur [Apply ice for 15min out of every hour for the next 12-24 hours as tolerated] : apply ice for 15 minutes out of every hour for the next 12-24 hours as tolerated [Previous OTC use and PT nontherapeutic] : patient has tried OTC's including aspirin, Ibuprofen, Aleve, etc or prescription NSAIDS, and/or exercises at home and/or physical therapy without satisfactory response [Patient had decreased mobility in the joint] : patient had decreased mobility in the joint [Risks, benefits, alternatives discussed / Verbal consent obtained] : the risks benefits, and alternatives have been discussed, and verbal consent was obtained [All ultrasound images have been permanently captured and stored accordingly in our picture archiving and communication system] : All ultrasound images have been permanently captured and stored accordingly in our picture archiving and communication system [Visualization of the needle and placement of injection was performed without complication] : visualization of the needle and placement of injection was performed without complication [Glenohmeral injection] : glenohumeral injection

## 2022-06-23 NOTE — PHYSICAL EXAM
[Normal Coordination] : normal coordination [Normal Sensation] : normal sensation [Normal Mood and Affect] : normal mood and affect [Orientated] : orientated [Able to Communicate] : able to communicate [Normal Skin] : normal skin [No Rash] : no rash [No Ulcers] : no ulcers [No Lesions] : no lesions [No obvious lymphadenopathy in areas examined] : no obvious lymphadenopathy in areas examined [Well Developed] : well developed [Well Nourished] : well nourished [Peripheral vascular exam is grossly normal] : peripheral vascular exam is grossly normal [No Respiratory Distress] : no respiratory distress [Bilateral] : shoulder bilaterally [Standing] : standing [5___] : internal rotation 5[unfilled]/5 [] : negative drop-arm test [FreeTextEntry8] : No tenderness at the left AC joint, biceps tendon or supraspinatus\par + mild tenderness at the left AC joint, biceps tendon or supraspinatus [FreeTextEntry9] : range of motion the same on both shoulders [de-identified] : 5 out of 5 supraspinatus bilaterally  [TWNoteComboBox7] : active forward flexion 170 degrees [de-identified] : active abduction 95 degrees [TWNoteComboBox6] : internal rotation L5 [de-identified] : external rotation 85 degrees

## 2022-06-23 NOTE — DISCUSSION/SUMMARY
[de-identified] : The patient has right shoulder severe glenohumeral osteoarthritis with medialization of the glenoid. He has left shoulder severe glenohumeral osteoarthritis with full thickness tear of the supraspinatus tendon.\par \par The patient received his second of three b/l euflexxa injections. \par He tolerated them well.\par Ultrasound was used.\par Patient will follow up in one week. \par

## 2022-06-23 NOTE — REASON FOR VISIT
[FreeTextEntry2] : The patient has right shoulder severe glenohumeral osteoarthritis with medialization of the glenoid. He has left shoulder severe glenohumeral osteoarthritis with full thickness tear of the supraspinatus tendon.

## 2022-07-07 ENCOUNTER — APPOINTMENT (OUTPATIENT)
Dept: ORTHOPEDIC SURGERY | Facility: CLINIC | Age: 62
End: 2022-07-07

## 2022-07-07 PROCEDURE — 20611 DRAIN/INJ JOINT/BURSA W/US: CPT | Mod: 50

## 2022-07-07 NOTE — PHYSICAL EXAM
[Normal Coordination] : normal coordination [Normal Sensation] : normal sensation [Normal Mood and Affect] : normal mood and affect [Orientated] : orientated [Able to Communicate] : able to communicate [Normal Skin] : normal skin [No Rash] : no rash [No Ulcers] : no ulcers [No Lesions] : no lesions [No obvious lymphadenopathy in areas examined] : no obvious lymphadenopathy in areas examined [Well Developed] : well developed [Well Nourished] : well nourished [Peripheral vascular exam is grossly normal] : peripheral vascular exam is grossly normal [No Respiratory Distress] : no respiratory distress [Bilateral] : shoulder bilaterally [Standing] : standing [5___] : internal rotation 5[unfilled]/5 [] : negative drop-arm test [FreeTextEntry8] : No tenderness at the left AC joint, biceps tendon or supraspinatus\par + mild tenderness at the left AC joint, biceps tendon or supraspinatus [FreeTextEntry9] : range of motion the same on both shoulders [de-identified] : 5 out of 5 supraspinatus bilaterally  [TWNoteComboBox7] : active forward flexion 170 degrees [de-identified] : active abduction 95 degrees [TWNoteComboBox6] : internal rotation L5 [de-identified] : external rotation 85 degrees

## 2022-07-07 NOTE — DISCUSSION/SUMMARY
[de-identified] : The patient has right shoulder severe glenohumeral osteoarthritis with medialization of the glenoid. He has left shoulder severe glenohumeral osteoarthritis with full thickness tear of the supraspinatus tendon.\par \par The patient received his third of three b/l euflexxa injections. \par He tolerated them well.\par Ultrasound was used.\par Patient will follow up in 8 weeks for re-evaluation. \par

## 2022-07-07 NOTE — HISTORY OF PRESENT ILLNESS
[de-identified] : The patient is here for his third of three bilateral lubricating injections. Last visit the patient was advised he is a candidate for a right total shoulder arthroplasty. He would like to hold off on having the procedure for as long as he can. He had bilateral knee arthroplasties done in 2016 that failed, he required multiple surgeries including revisions therefore he is apprehensive about having the surgery. The patient would like to have cortisone injections done in both shoulders. He states he has had several in both shoulders far more than 3 in each. The patient states he has full ROM, his complaint is circumferential pain worse with range of motion.

## 2022-07-07 NOTE — PROCEDURE
[Large Joint Injection] : Large joint injection [Bilateral] : bilaterally of the [Glenohumeral Joint] : glenohumeral joint [Pain] : pain [Inflammation] : inflammation [X-ray evidence of Osteoarthritis on this or prior visit] : x-ray evidence of Osteoarthritis on this or prior visit [Betadine] : betadine [Euflexxa] : Euflexxa [#3] : series #3 [] : Patient tolerated procedure well [Call if redness, pain or fever occur] : call if redness, pain or fever occur [Apply ice for 15min out of every hour for the next 12-24 hours as tolerated] : apply ice for 15 minutes out of every hour for the next 12-24 hours as tolerated [Previous OTC use and PT nontherapeutic] : patient has tried OTC's including aspirin, Ibuprofen, Aleve, etc or prescription NSAIDS, and/or exercises at home and/or physical therapy without satisfactory response [Patient had decreased mobility in the joint] : patient had decreased mobility in the joint [Risks, benefits, alternatives discussed / Verbal consent obtained] : the risks benefits, and alternatives have been discussed, and verbal consent was obtained [Glenohmeral injection] : glenohumeral injection [All ultrasound images have been permanently captured and stored accordingly in our picture archiving and communication system] : All ultrasound images have been permanently captured and stored accordingly in our picture archiving and communication system [Visualization of the needle and placement of injection was performed without complication] : visualization of the needle and placement of injection was performed without complication

## 2022-08-09 NOTE — DISCUSSION/SUMMARY
[de-identified] : The patient has right shoulder severe glenohumeral osteoarthritis with medialization of the glenoid. He has left shoulder severe glenohumeral osteoarthritis with full thickness tear of the supraspinatus tendon.\par \par Today we discussed the possible complications with accelerating damage of the joint with repeated cortisone injections.\par I advised him Euflexxa may be the better choice for injection therapy since he wants to hold off as long as he can with having a total shoulder replacement. The patient is in agreement.\par \par Bilateral Euflexxa injections #1 of 3 done using aseptic today in the office.\par The patient tolerated the procedures well.\par U/S guidance was used for localization. \par \par The patient will follow up in 1 week for his 2nd of 3 bilateral Euflexxa injections. \par

## 2022-08-09 NOTE — PHYSICAL EXAM
[Normal Coordination] : normal coordination [Normal Sensation] : normal sensation [Normal Mood and Affect] : normal mood and affect [Orientated] : orientated [Able to Communicate] : able to communicate [Normal Skin] : normal skin [No Rash] : no rash [No Ulcers] : no ulcers [No Lesions] : no lesions [No obvious lymphadenopathy in areas examined] : no obvious lymphadenopathy in areas examined [Well Developed] : well developed [Well Nourished] : well nourished [Peripheral vascular exam is grossly normal] : peripheral vascular exam is grossly normal [No Respiratory Distress] : no respiratory distress [Bilateral] : shoulder bilaterally [Standing] : standing [5___] : internal rotation 5[unfilled]/5 [] : negative drop-arm test [FreeTextEntry8] : No tenderness at the left AC joint, biceps tendon or supraspinatus\par + mild tenderness at the left AC joint, biceps tendon or supraspinatus [FreeTextEntry9] : range of motion the same on both shoulders [de-identified] : 5 out of 5 supraspinatus bilaterally  [TWNoteComboBox7] : active forward flexion 170 degrees [de-identified] : active abduction 95 degrees [TWNoteComboBox6] : internal rotation L5 [de-identified] : external rotation 85 degrees

## 2022-08-09 NOTE — HISTORY OF PRESENT ILLNESS
[de-identified] : The patient is a 61 year old male with complaints of right shoulder pain worse than left. Last visit the patient was advised he is a candidate for a right total shoulder arthroplasty. He would like to hold off on having the procedure for as long as he can. He had bilateral knee arthroplasties done in 2016 that failed, he required multiple surgeries including revisions therefore he is apprehensive about having the surgery. The patient would like to have cortisone injections done in both shoulders. He states he has had several in both shoulders far more than 3 in each. The patient states he has full ROM, his complaint is circumferential pain worse with range of motion.

## 2022-09-08 ENCOUNTER — APPOINTMENT (OUTPATIENT)
Dept: ORTHOPEDIC SURGERY | Facility: CLINIC | Age: 62
End: 2022-09-08

## 2022-09-08 PROCEDURE — 99214 OFFICE O/P EST MOD 30 MIN: CPT

## 2022-09-08 NOTE — HISTORY OF PRESENT ILLNESS
[de-identified] : The patient is here for follow up of b/l shoulders. He reports having moderate relief to both shoulders. The patient reports no AI use but has had relief in the past. He wishes to restart the injections as soon as he can ie 6 months. He had bilateral knee arthroplasties done in 2016 that failed, he required multiple surgeries including revisions therefore he is apprehensive about having the surgery. The patient would like to have cortisone injections done in both shoulders. He states he has had several in both shoulders far more than 3 in each. The patient states he has full ROM, his complaint is circumferential pain worse with range of motion.

## 2022-09-08 NOTE — DISCUSSION/SUMMARY
[de-identified] : The patient has right shoulder severe glenohumeral osteoarthritis with medialization of the glenoid. He has left shoulder severe glenohumeral osteoarthritis with full thickness tear of the supraspinatus tendon.\par \par The patient reports moderate relief with his injections. \par He is happy with his progress.\par The patient was prescribed 800mg ibuprofen for pain.\par All risks, benefits, and alternatives were discussed for this medication.\par The patient can restart lubricating injections after 1/7/23.\par The patient will consider GH cortisone injections.\par

## 2022-09-08 NOTE — PHYSICAL EXAM
[Normal Coordination] : normal coordination [Normal Sensation] : normal sensation [Normal Mood and Affect] : normal mood and affect [Orientated] : orientated [Able to Communicate] : able to communicate [Normal Skin] : normal skin [No Rash] : no rash [No Ulcers] : no ulcers [No Lesions] : no lesions [No obvious lymphadenopathy in areas examined] : no obvious lymphadenopathy in areas examined [Well Developed] : well developed [Well Nourished] : well nourished [Peripheral vascular exam is grossly normal] : peripheral vascular exam is grossly normal [No Respiratory Distress] : no respiratory distress [Bilateral] : shoulder bilaterally [Standing] : standing [5___] : internal rotation 5[unfilled]/5 [] : negative drop-arm test [FreeTextEntry8] : No tenderness at the left AC joint, biceps tendon or supraspinatus\par + mild tenderness at the left AC joint, biceps tendon or supraspinatus [FreeTextEntry9] : range of motion the same on both shoulders [de-identified] : 5 out of 5 supraspinatus bilaterally  [TWNoteComboBox7] : active forward flexion 170 degrees [de-identified] : active abduction 95 degrees [TWNoteComboBox6] : internal rotation L5 [de-identified] : external rotation 85 degrees

## 2022-09-22 ENCOUNTER — OFFICE (OUTPATIENT)
Dept: URBAN - METROPOLITAN AREA CLINIC 105 | Facility: CLINIC | Age: 62
Setting detail: OPHTHALMOLOGY
End: 2022-09-22
Payer: MEDICARE

## 2022-09-22 DIAGNOSIS — H43.811: ICD-10-CM

## 2022-09-22 DIAGNOSIS — H43.393: ICD-10-CM

## 2022-09-22 DIAGNOSIS — H11.153: ICD-10-CM

## 2022-09-22 DIAGNOSIS — H25.12: ICD-10-CM

## 2022-09-22 DIAGNOSIS — H40.013: ICD-10-CM

## 2022-09-22 PROCEDURE — 76514 ECHO EXAM OF EYE THICKNESS: CPT | Performed by: OPHTHALMOLOGY

## 2022-09-22 PROCEDURE — 92133 CPTRZD OPH DX IMG PST SGM ON: CPT | Performed by: OPHTHALMOLOGY

## 2022-09-22 PROCEDURE — 92004 COMPRE OPH EXAM NEW PT 1/>: CPT | Performed by: OPHTHALMOLOGY

## 2022-09-22 ASSESSMENT — REFRACTION_AUTOREFRACTION
OD_CYLINDER: -0.75
OD_AXIS: 017
OS_SPHERE: PL
OS_CYLINDER: -0.50
OD_SPHERE: +0.25
OS_AXIS: 145

## 2022-09-22 ASSESSMENT — CONFRONTATIONAL VISUAL FIELD TEST (CVF)
OS_FINDINGS: FULL
OD_FINDINGS: FULL

## 2022-09-22 ASSESSMENT — PACHYMETRY
OD_CT_UM: 484
OS_CT_CORRECTION: 5
OD_CT_CORRECTION: 4
OS_CT_UM: 479

## 2022-09-22 ASSESSMENT — VISUAL ACUITY
OS_BCVA: 20/20-1
OD_BCVA: 20/20

## 2022-09-22 ASSESSMENT — SPHEQUIV_DERIVED: OD_SPHEQUIV: -0.125

## 2022-09-22 ASSESSMENT — KERATOMETRY
OD_AXISANGLE_DEGREES: 105
OD_K1POWER_DIOPTERS: 42.00
OS_K1POWER_DIOPTERS: 45.00
OD_K2POWER_DIOPTERS: 42.75
OS_AXISANGLE_DEGREES: 153
OS_K2POWER_DIOPTERS: 46.00

## 2022-09-22 ASSESSMENT — AXIALLENGTH_DERIVED: OD_AL: 24.0631

## 2022-09-29 NOTE — ED ADULT NURSE NOTE - [id number]_A_ED
Cooperative Uncooperative Uncooperative Cooperative Uncooperative Cooperative Cooperative Uncooperative Uncooperative Uncooperative No

## 2023-01-19 ENCOUNTER — APPOINTMENT (OUTPATIENT)
Dept: ORTHOPEDIC SURGERY | Facility: CLINIC | Age: 63
End: 2023-01-19

## 2023-01-19 ENCOUNTER — RESULT CHARGE (OUTPATIENT)
Age: 63
End: 2023-01-19

## 2023-01-19 ENCOUNTER — APPOINTMENT (OUTPATIENT)
Dept: ORTHOPEDIC SURGERY | Facility: CLINIC | Age: 63
End: 2023-01-19
Payer: MEDICARE

## 2023-01-19 PROCEDURE — 20611 DRAIN/INJ JOINT/BURSA W/US: CPT | Mod: 50

## 2023-01-19 PROCEDURE — 99214 OFFICE O/P EST MOD 30 MIN: CPT | Mod: 25

## 2023-01-19 PROCEDURE — 73030 X-RAY EXAM OF SHOULDER: CPT | Mod: 50

## 2023-01-19 NOTE — PHYSICAL EXAM
[Normal Coordination] : normal coordination [Normal Sensation] : normal sensation [Normal Mood and Affect] : normal mood and affect [Orientated] : orientated [Able to Communicate] : able to communicate [Normal Skin] : normal skin [No Rash] : no rash [No Ulcers] : no ulcers [No Lesions] : no lesions [No obvious lymphadenopathy in areas examined] : no obvious lymphadenopathy in areas examined [Well Developed] : well developed [Well Nourished] : well nourished [Peripheral vascular exam is grossly normal] : peripheral vascular exam is grossly normal [No Respiratory Distress] : no respiratory distress [Bilateral] : shoulder bilaterally [Standing] : standing [5___] : internal rotation 5[unfilled]/5 [] : negative drop-arm test [FreeTextEntry8] : No tenderness at the left AC joint, biceps tendon or supraspinatus\par + mild tenderness at the left AC joint, biceps tendon or supraspinatus [FreeTextEntry9] : range of motion the same on both shoulders [de-identified] : 5 out of 5 supraspinatus bilaterally  [TWNoteComboBox7] : active forward flexion 170 degrees [de-identified] : active abduction 95 degrees [TWNoteComboBox6] : internal rotation L5 [de-identified] : external rotation 85 degrees

## 2023-01-19 NOTE — HISTORY OF PRESENT ILLNESS
[de-identified] : The patient is here to restart euflexxa injections for the bilateral shoulders. He is doing well but has noticed an increase in pain at night and at rest. He has pain when laying on either shoulder.  He had bilateral knee arthroplasties done in 2016 that failed, he required multiple surgeries including revisions therefore he is apprehensive about having the surgery. The patient would like to have cortisone injections done in both shoulders. He states he has had several in both shoulders far more than 3 in each. The patient states he has full ROM, his complaint is circumferential pain worse with range of motion.

## 2023-01-19 NOTE — DISCUSSION/SUMMARY
[de-identified] : The patient has right shoulder severe glenohumeral osteoarthritis with medialization of the glenoid. He has left shoulder severe glenohumeral osteoarthritis with full thickness tear of the supraspinatus tendon.\par \par The patient is here to restart euflexxa injections for bilateral shoulders. \par His xrays show consistent, severe GHOA to both shoulders; similar to last xrays.\par He tolerated both injections well.\par Ultrasound was used.\par He will follow up in one week. \par

## 2023-01-26 ENCOUNTER — APPOINTMENT (OUTPATIENT)
Dept: ORTHOPEDIC SURGERY | Facility: CLINIC | Age: 63
End: 2023-01-26
Payer: MEDICARE

## 2023-01-26 PROCEDURE — 20611 DRAIN/INJ JOINT/BURSA W/US: CPT | Mod: NC

## 2023-01-26 NOTE — PHYSICAL EXAM
[Normal Coordination] : normal coordination [Normal Sensation] : normal sensation [Normal Mood and Affect] : normal mood and affect [Orientated] : orientated [Able to Communicate] : able to communicate [Normal Skin] : normal skin [No Rash] : no rash [No Ulcers] : no ulcers [No Lesions] : no lesions [No obvious lymphadenopathy in areas examined] : no obvious lymphadenopathy in areas examined [Well Developed] : well developed [Well Nourished] : well nourished [Peripheral vascular exam is grossly normal] : peripheral vascular exam is grossly normal [No Respiratory Distress] : no respiratory distress [Bilateral] : shoulder bilaterally [Standing] : standing [5___] : internal rotation 5[unfilled]/5 [] : negative drop-arm test [FreeTextEntry8] : No tenderness at the left AC joint, biceps tendon or supraspinatus\par + mild tenderness at the left AC joint, biceps tendon or supraspinatus [FreeTextEntry9] : range of motion the same on both shoulders [de-identified] : 5 out of 5 supraspinatus bilaterally  [TWNoteComboBox7] : active forward flexion 170 degrees [de-identified] : active abduction 95 degrees [TWNoteComboBox6] : internal rotation L5 [de-identified] : external rotation 85 degrees

## 2023-01-26 NOTE — HISTORY OF PRESENT ILLNESS
[de-identified] : The patient is here to continue euflexxa injections for the bilateral shoulders. He is doing well but has noticed an increase in pain at night and at rest. He has pain when laying on either shoulder.  He had bilateral knee arthroplasties done in 2016 that failed, he required multiple surgeries including revisions therefore he is apprehensive about having the surgery. The patient would like to have cortisone injections done in both shoulders. He states he has had several in both shoulders far more than 3 in each. The patient states he has full ROM, his complaint is circumferential pain worse with range of motion.

## 2023-01-26 NOTE — DISCUSSION/SUMMARY
[de-identified] : The patient has right shoulder severe glenohumeral osteoarthritis with medialization of the glenoid. He has left shoulder severe glenohumeral osteoarthritis with full thickness tear of the supraspinatus tendon.\par \par The patient is here to continue euflexxa injections. \par He tolerated both injections well.\par Ultrasound was used.\par He will follow up in one week. \par

## 2023-02-02 ENCOUNTER — APPOINTMENT (OUTPATIENT)
Dept: ORTHOPEDIC SURGERY | Facility: CLINIC | Age: 63
End: 2023-02-02
Payer: MEDICARE

## 2023-02-02 PROCEDURE — 20611 DRAIN/INJ JOINT/BURSA W/US: CPT | Mod: NC

## 2023-02-02 NOTE — PHYSICAL EXAM
[Normal Coordination] : normal coordination [Normal Sensation] : normal sensation [Normal Mood and Affect] : normal mood and affect [Orientated] : orientated [Able to Communicate] : able to communicate [Normal Skin] : normal skin [No Rash] : no rash [No Ulcers] : no ulcers [No Lesions] : no lesions [No obvious lymphadenopathy in areas examined] : no obvious lymphadenopathy in areas examined [Well Developed] : well developed [Well Nourished] : well nourished [Peripheral vascular exam is grossly normal] : peripheral vascular exam is grossly normal [No Respiratory Distress] : no respiratory distress [Bilateral] : shoulder bilaterally [Standing] : standing [5___] : internal rotation 5[unfilled]/5 [] : negative drop-arm test [FreeTextEntry8] : No tenderness at the left AC joint, biceps tendon or supraspinatus\par + mild tenderness at the left AC joint, biceps tendon or supraspinatus [FreeTextEntry9] : range of motion the same on both shoulders [de-identified] : 5 out of 5 supraspinatus bilaterally  [TWNoteComboBox7] : active forward flexion 170 degrees [de-identified] : active abduction 95 degrees [TWNoteComboBox6] : internal rotation L5 [de-identified] : external rotation 85 degrees

## 2023-02-02 NOTE — DISCUSSION/SUMMARY
[de-identified] : The patient has right shoulder severe glenohumeral osteoarthritis with medialization of the glenoid. He has left shoulder severe glenohumeral osteoarthritis with full thickness tear of the supraspinatus tendon.\par \par The patient is here for his last injections for bilateral shoulders.\par He tolerated both injections well.\par Ultrasound was used.\par He will follow up with Dr. Morgan as needed.\par

## 2023-02-02 NOTE — HISTORY OF PRESENT ILLNESS
[de-identified] : The patient is here to continue euflexxa injections for the bilateral shoulders. He is doing well but has noticed an increase in pain at night and at rest. He has pain when laying on either shoulder.  He had bilateral knee arthroplasties done in 2016 that failed, he required multiple surgeries including revisions therefore he is apprehensive about having the surgery. The patient would like to have cortisone injections done in both shoulders. He states he has had several in both shoulders far more than 3 in each. The patient states he has full ROM, his complaint is circumferential pain worse with range of motion.

## 2023-02-02 NOTE — PROCEDURE
[Bilateral] : bilaterally of the [Euflexxa(20mg)] : 20mg of Euflexxa [#3] : series #3 [Call if redness, pain or fever occur] : call if redness, pain or fever occur [Apply ice for 15min out of every hour for the next 12-24 hours as tolerated] : apply ice for 15 minutes out of every hour for the next 12-24 hours as tolerated [Previous OTC use and PT nontherapeutic] : patient has tried OTC's including aspirin, Ibuprofen, Aleve, etc or prescription NSAIDS, and/or exercises at home and/or physical therapy without satisfactory response [Patient had decreased mobility in the joint] : patient had decreased mobility in the joint [Risks, benefits, alternatives discussed / Verbal consent obtained] : the risks benefits, and alternatives have been discussed, and verbal consent was obtained [Glenohmeral injection] : glenohumeral injection [All ultrasound images have been permanently captured and stored accordingly in our picture archiving and communication system] : All ultrasound images have been permanently captured and stored accordingly in our picture archiving and communication system [Visualization of the needle and placement of injection was performed without complication] : visualization of the needle and placement of injection was performed without complication

## 2023-02-03 ENCOUNTER — APPOINTMENT (OUTPATIENT)
Dept: PULMONOLOGY | Facility: CLINIC | Age: 63
End: 2023-02-03
Payer: MEDICARE

## 2023-02-03 VITALS
BODY MASS INDEX: 32.37 KG/M2 | DIASTOLIC BLOOD PRESSURE: 70 MMHG | RESPIRATION RATE: 17 BRPM | SYSTOLIC BLOOD PRESSURE: 124 MMHG | HEIGHT: 72 IN | HEART RATE: 62 BPM | OXYGEN SATURATION: 97 % | WEIGHT: 239 LBS

## 2023-02-03 DIAGNOSIS — Z99.89 OBSTRUCTIVE SLEEP APNEA (ADULT) (PEDIATRIC): ICD-10-CM

## 2023-02-03 DIAGNOSIS — E66.9 OBESITY, UNSPECIFIED: ICD-10-CM

## 2023-02-03 DIAGNOSIS — R06.00 DYSPNEA, UNSPECIFIED: ICD-10-CM

## 2023-02-03 DIAGNOSIS — R06.02 SHORTNESS OF BREATH: ICD-10-CM

## 2023-02-03 DIAGNOSIS — G47.33 OBSTRUCTIVE SLEEP APNEA (ADULT) (PEDIATRIC): ICD-10-CM

## 2023-02-03 PROCEDURE — 99214 OFFICE O/P EST MOD 30 MIN: CPT | Mod: 25

## 2023-02-03 PROCEDURE — 94010 BREATHING CAPACITY TEST: CPT

## 2023-02-03 NOTE — DISCUSSION/SUMMARY
[FreeTextEntry1] : \par #1. PFTs performed previously essentially normal.\par #2. The patient does not appear to require chronic BD therapy at this time; Albuterol inhaler as needed\par #3. SOBOE is likely related to weight or deconditioning given normal PFTs\par #4. Diet and exercise for weight loss\par #5. Continue autoCPAP to treat mild MASOOD with an AHI of 14\par #6. Replace equipment as needed; ordered new machine and supplies 2/3/23 as current machine is broken beyond repair\par #7. Prior CXR from 12/9/20 was clear; prior chest CT from 2017 revealed stable nodules going back to 2013\par #8. S/p both Covid vaccines;s/p Covid infections\par #9. There is no pulmonary contraindication for the patient's upcoming back surgery.  I would recommend that CPAP at 10 cm of water should be available to use post-op and with sleep. I would also recommend post op incentive spirometry, GI/DVT prophylaxis as needed, early ambulation as able, and adequate pain control. Would recommend that O2 saturation should be monitored during and after the procedure. \par #10. Could consider further w/u for asthma with MCT if desired by pt for occasional SOB and wheeze but he would like to hold off for now and will use his Albuterol inhaler as needed but rarely requires\par #11. F/u one month after starting new CPAP therapy with compliance \par #12. Reviewed risks of exposure and symptoms of Covid-19 virus, including how the virus is spread and precautions to avoid sonia virus.\par \par The patient expressed understanding and agreement with the above recommendations/plan and accepts responsibility to be compliant with recommended testing, therapies, and f/u visits.\par All relevant questions and concerns were addressed.

## 2023-02-03 NOTE — PROCEDURE
[FreeTextEntry1] : PFTs 9/15/14 - essentially normal\par PFTs 12/8/20 - essentially normal\par Spirometry from 2/3/23 - normal

## 2023-02-03 NOTE — HISTORY OF PRESENT ILLNESS
[Good Compliance] : good compliance with treatment [Good Tolerance] : good tolerance of treatment [Good Symptom Control] : good symptom control [Follow-Up - Routine Clinic] : a routine clinic follow-up of [Excess Weight] : excess weight [Currently Experiencing] : The patient is currently experiencing symptoms. [Dyspnea] : dyspnea [Knee Pain] : knee pain [Back Pain] : back pain [Low Calorie Diet] : low calorie diet [Fair Compliance] : fair compliance with treatment [Fair Tolerance] : fair tolerance of treatment [Poor Symptom Control] : poor symptom control [Sleep Apnea] : sleep apnea [Hypertension] : hypertension [High] : high [Low Calorie] : low calorie [Well Balanced Diet] : well balanced meals [___ Times/Week] : exercises [unfilled] times per week [Aerobic Conditioning] : aerobic conditioning [On ___] : performed on [unfilled] [Patient] : the patient [To Assess ___] : to assess [unfilled] [None] : no new symptoms reported [TextBox_4] : Never smoker\par The patient is a never smoker but uses marijuana daily.\par The patient has a loop recorder.\par S/p Covid infection 9/2021 and again 12/6/22 with mild URI symptoms and was given Z-shreya both times.\par He presents for preop evaluation for back surgery. [de-identified] : autoCPAP [FreeTextEntry9] : Chest CT [FreeTextEntry8] : Stable nodules going back to 2013

## 2023-02-03 NOTE — REVIEW OF SYSTEMS
[Postnasal Drip] : postnasal drip [SOB on Exertion] : sob on exertion [Seasonal Allergies] : seasonal allergies [GERD] : gerd [Back Pain] : back pain [Negative] : Endocrine

## 2023-02-03 NOTE — CONSULT LETTER
[Dear  ___] : Dear  [unfilled], [Consult Letter:] : I had the pleasure of evaluating your patient, [unfilled]. [Please see my note below.] : Please see my note below. [Consult Closing:] : Thank you very much for allowing me to participate in the care of this patient.  If you have any questions, please do not hesitate to contact me. [Sincerely,] : Sincerely, [FreeTextEntry3] : Alton Montgomery MD, FCCP, D. ABSM\par Pulmonary and Sleep Medicine\par [facility] [place]

## 2023-05-05 ENCOUNTER — OFFICE (OUTPATIENT)
Dept: URBAN - METROPOLITAN AREA CLINIC 105 | Facility: CLINIC | Age: 63
Setting detail: OPHTHALMOLOGY
End: 2023-05-05
Payer: MEDICARE

## 2023-05-05 DIAGNOSIS — H40.013: ICD-10-CM

## 2023-05-05 DIAGNOSIS — H25.013: ICD-10-CM

## 2023-05-05 DIAGNOSIS — H25.12: ICD-10-CM

## 2023-05-05 DIAGNOSIS — H11.153: ICD-10-CM

## 2023-05-05 PROCEDURE — 99213 OFFICE O/P EST LOW 20 MIN: CPT | Performed by: OPHTHALMOLOGY

## 2023-05-05 PROCEDURE — 92083 EXTENDED VISUAL FIELD XM: CPT | Performed by: OPHTHALMOLOGY

## 2023-05-05 ASSESSMENT — TONOMETRY
OS_IOP_MMHG: 16
OD_IOP_MMHG: 15

## 2023-05-05 ASSESSMENT — REFRACTION_AUTOREFRACTION
OS_AXIS: 154
OD_AXIS: 014
OS_CYLINDER: -0.50
OS_SPHERE: +0.25
OD_SPHERE: +0.25
OD_CYLINDER: -0.50

## 2023-05-05 ASSESSMENT — CONFRONTATIONAL VISUAL FIELD TEST (CVF)
OD_FINDINGS: FULL
OS_FINDINGS: FULL

## 2023-05-05 ASSESSMENT — AXIALLENGTH_DERIVED
OD_AL: 23.9177
OS_AL: 24.156

## 2023-05-05 ASSESSMENT — KERATOMETRY
OS_K1POWER_DIOPTERS: 42.00
OD_AXISANGLE_DEGREES: 103
OD_K2POWER_DIOPTERS: 43.25
OS_AXISANGLE_DEGREES: 090
OS_K2POWER_DIOPTERS: 42.00
OD_K1POWER_DIOPTERS: 42.00

## 2023-05-05 ASSESSMENT — PACHYMETRY
OS_CT_UM: 479
OD_CT_UM: 484
OD_CT_CORRECTION: 4
OS_CT_CORRECTION: 5

## 2023-05-05 ASSESSMENT — VISUAL ACUITY
OD_BCVA: 20/20
OS_BCVA: 20/20

## 2023-05-05 ASSESSMENT — SPHEQUIV_DERIVED
OS_SPHEQUIV: 0
OD_SPHEQUIV: 0

## 2023-05-17 ENCOUNTER — APPOINTMENT (OUTPATIENT)
Dept: ORTHOPEDIC SURGERY | Facility: CLINIC | Age: 63
End: 2023-05-17
Payer: MEDICARE

## 2023-05-17 PROCEDURE — 20611 DRAIN/INJ JOINT/BURSA W/US: CPT | Mod: 50

## 2023-05-17 PROCEDURE — 99214 OFFICE O/P EST MOD 30 MIN: CPT | Mod: 25

## 2023-05-17 NOTE — PHYSICAL EXAM
[Normal Coordination] : normal coordination [Normal Sensation] : normal sensation [Normal Mood and Affect] : normal mood and affect [Orientated] : orientated [Able to Communicate] : able to communicate [Normal Skin] : normal skin [No Rash] : no rash [No Ulcers] : no ulcers [No Lesions] : no lesions [No obvious lymphadenopathy in areas examined] : no obvious lymphadenopathy in areas examined [Well Developed] : well developed [Well Nourished] : well nourished [Peripheral vascular exam is grossly normal] : peripheral vascular exam is grossly normal [No Respiratory Distress] : no respiratory distress

## 2023-05-17 NOTE — PROCEDURE
[FreeTextEntry3] : Large Joint Injection was performed because of pain and inflammation.\par \par Anesthesia: ethyl chloride sprayed topically..\par \par Depomedrol: An injection of Depomedrol 40 mg , 1 cc.\par \par Lidocaine: 3 cc.\par \par Medication was injected in the bilatera GH joint. Patient has tried OTC's including aspirin, Ibuprofen, Aleve etc or prescription NSAIDS, and/or exercises at home and/ or physical therapy without satisfactory response and Patient has decreased mobility in the joint. After verbal consent using sterile preparation and technique. The risks, benefits, and alternatives to cortisone injection were explained in full to the patient. Risks outlined include but are not limited to infection, sepsis, bleeding, scarring, skin discoloration, temporary increase in pain, syncopal episode, failure to resolve symptoms, allergic reaction, symptom recurrence, and elevation of blood sugar in diabetics. Patient understood the risks. All questions were answered. After discussion of options, patient requested an injection. Oral informed consent was obtained and sterile prep was done of the injection site. Sterile technique was utilized for the procedure including the preparation of the solutions used for the injection. Patient tolerated the procedure well. Advised to ice the injection site this evening. Prep with alcohol locally to site. Sterile technique used. Patient tolerated procedure well. Post Procedure Instructions: Patient was advised to call if redness, pain, or fever occur and apply ice for 15 min. out of every hour for the next 12-24 hours as tolerated. patient was advised to rest the joint(s) for 7 days.\par \par Ultrasound Guidance was used for the following reasons: prior failure or difficult injection.\par \par Ultrasound guided injection was performed of the shoulder, visualization of the needle and placement of injection was performed without complication.\par

## 2023-05-17 NOTE — HISTORY OF PRESENT ILLNESS
[de-identified] : The patient is here for follow up of b/l shoulders. He reports having moderate relief to both shoulders. The patient reports no AI use but has had relief in the past. He wishes to restart the injections as soon as he can ie 6 months. He had bilateral knee arthroplasties done in 2016 that failed, he required multiple surgeries including revisions therefore he is apprehensive about having the surgery. The patient would like to have cortisone injections done in both shoulders. He states he has had several in both shoulders far more than 3 in each. The patient states he has full ROM, his complaint is circumferential pain worse with range of motion.\par \par 5/17/23: Patient here for b/l shoulders. Pt had relief from prior eulexxa injections done by Dr. Guadarrama and wishes to proceed with another round. Pt feels a throbbing pain that keeps him up at night. \par Patient has bone-on-bone arthritis of the right shoulder with medialization and left bone-on-bone arthritis as well.

## 2023-05-17 NOTE — DISCUSSION/SUMMARY
[de-identified] : The patient has right shoulder severe glenohumeral osteoarthritis with medialization of the glenoid. He has left shoulder severe glenohumeral osteoarthritis with full thickness tear of the supraspinatus tendon.\par \par The patient reports moderate relief prior injections. \par Pt wishes to proceed with Euflexxa injections which can be done in Aug (After Aug 2)\par B/l GH injections for pain\par Follow up in aug\par  \par \par (1) We discussed a comprehensive treatment plans that included a prescription management plan involving the use of prescription strength medications to include Ibuprofen 600-800 mg TID, versus 500-650 mg Tylenol. We also discussed prescribing topical diclofenac (Voltaren gel) as well as once daily Meloxicam 15 mg.\par (2) The patient has More Than One chronic injuries/illnesses as outlined, discussed, and documented by ICD 10 codes listed, as well as the HPI and Plan section.\par There is a moderate risk of morbidity with further treatment, especially from use of prescription strength medications and possible side effects of these medications which include upset stomach and cardiac/renal issues with long term use were discussed.\par (3) I recommended that the patient follow-up with their medical physician to discuss any significant specific potential issues with long term use such as interactions with current medications or with exacerbation of underlying medical morbidities. \par \par Attestation:\par I, Keira Beckett , attest that this documentation has been prepared under the direction and in the presence of Provider Ricardo Morgan MD.\par The documentation recorded by the scribe, in my presence, accurately reflects the service I personally performed, and the decisions made by me with my edits as appropriate.\par Ricardo Morgan MD\par

## 2023-06-05 NOTE — ED PROVIDER NOTE - NS ED MD DISPO DISCHARGE
Detailed VM left for pt with Dr. Ochoa's recommendations.  Advised we can send a referral or have pcp call him to discuss.  Clinic number left for callback or advised to message via [facility].   Home

## 2023-08-02 ENCOUNTER — APPOINTMENT (OUTPATIENT)
Dept: ORTHOPEDIC SURGERY | Facility: CLINIC | Age: 63
End: 2023-08-02

## 2023-08-02 ENCOUNTER — APPOINTMENT (OUTPATIENT)
Dept: ORTHOPEDIC SURGERY | Facility: CLINIC | Age: 63
End: 2023-08-02
Payer: MEDICARE

## 2023-08-02 PROCEDURE — 20611 DRAIN/INJ JOINT/BURSA W/US: CPT | Mod: 50

## 2023-08-02 PROCEDURE — 99214 OFFICE O/P EST MOD 30 MIN: CPT | Mod: 25

## 2023-08-02 NOTE — PROCEDURE
[FreeTextEntry3] : Procedure Note: After a long discussion was had with the patient outlining risks/benefits we decided to proceed with a series of Euflexxa injections.   Denies any negative reactions.  Patient was treated today with injection #1. Tolerated the injection well.  Advised to contact the office or presents to ER should they develop any swelling, erythema or fever

## 2023-08-02 NOTE — HISTORY OF PRESENT ILLNESS
[de-identified] : The patient is here for follow up of b/l shoulders. He reports having moderate relief to both shoulders. The patient reports no AI use but has had relief in the past. He wishes to restart the injections as soon as he can ie 6 months. He had bilateral knee arthroplasties done in 2016 that failed, he required multiple surgeries including revisions therefore he is apprehensive about having the surgery. The patient would like to have cortisone injections done in both shoulders. He states he has had several in both shoulders far more than 3 in each. The patient states he has full ROM, his complaint is circumferential pain worse with range of motion.  5/17/23: Patient here for b/l shoulders. Pt had relief from prior eulexxa injections done by Dr. Guadarrama and wishes to proceed with another round. Pt feels a throbbing pain that keeps him up at night.  Patient has bone-on-bone arthritis of the right shoulder with medialization and left bone-on-bone arthritis as well.  8/2/23: Patient here for b/l shoulder pain. Pt would like to discuss bilateral Euflexxa injections.

## 2023-08-09 ENCOUNTER — APPOINTMENT (OUTPATIENT)
Dept: ORTHOPEDIC SURGERY | Facility: CLINIC | Age: 63
End: 2023-08-09
Payer: MEDICARE

## 2023-08-09 PROCEDURE — 20611 DRAIN/INJ JOINT/BURSA W/US: CPT | Mod: 50

## 2023-08-09 PROCEDURE — 99214 OFFICE O/P EST MOD 30 MIN: CPT | Mod: 25

## 2023-08-09 NOTE — DISCUSSION/SUMMARY
[de-identified] : The patient has right shoulder severe glenohumeral osteoarthritis with medialization of the glenoid. He has left shoulder severe glenohumeral osteoarthritis with full thickness tear of the supraspinatus tendon.  The patient reports moderate relief prior injections.  Pt wishes to proceed with Euflexxa  After risks and benefits discussed pt would like to proceed with B/L Euflexxa injection #2 B/l GH injections provided little to no relief  Follow up in one week for Euflexxa #3    (1) We discussed a comprehensive treatment plans that included a prescription management plan involving the use of prescription strength medications to include Ibuprofen 600-800 mg TID, versus 500-650 mg Tylenol. We also discussed prescribing topical diclofenac (Voltaren gel) as well as once daily Meloxicam 15 mg. (2) The patient has More Than One chronic injuries/illnesses as outlined, discussed, and documented by ICD 10 codes listed, as well as the HPI and Plan section. There is a moderate risk of morbidity with further treatment, especially from use of prescription strength medications and possible side effects of these medications which include upset stomach and cardiac/renal issues with long term use were discussed. (3) I recommended that the patient follow-up with their medical physician to discuss any significant specific potential issues with long term use such as interactions with current medications or with exacerbation of underlying medical morbidities.   Attestation: I, Keira CHILD'Airam , attest that this documentation has been prepared under the direction and in the presence of Provider Ricardo Morgan MD. The documentation recorded by the scribe, in my presence, accurately reflects the service I personally performed, and the decisions made by me with my edits as appropriate. Ricardo Morgan MD

## 2023-08-09 NOTE — HISTORY OF PRESENT ILLNESS
[de-identified] : The patient is here for follow up of b/l shoulders. He reports having moderate relief to both shoulders. The patient reports no AI use but has had relief in the past. He wishes to restart the injections as soon as he can ie 6 months. He had bilateral knee arthroplasties done in 2016 that failed, he required multiple surgeries including revisions therefore he is apprehensive about having the surgery. The patient would like to have cortisone injections done in both shoulders. He states he has had several in both shoulders far more than 3 in each. The patient states he has full ROM, his complaint is circumferential pain worse with range of motion.  5/17/23: Patient here for b/l shoulders. Pt had relief from prior eulexxa injections done by Dr. Guadarrama and wishes to proceed with another round. Pt feels a throbbing pain that keeps him up at night.  Patient has bone-on-bone arthritis of the right shoulder with medialization and left bone-on-bone arthritis as well.  8/2/23: Patient here for b/l shoulder pain. Pt would like to discuss bilateral Euflexxa injections.   8/9/23: Patient here for bilateral shoulder pain. Pt would like to discuss bilateral Euflexxa #2

## 2023-08-09 NOTE — PROCEDURE
[FreeTextEntry3] : Procedure Note: After a long discussion was had with the patient outlining risks/benefits we decided to proceed with a series of Euflexxa injections.   Denies any negative reactions.  Patient was treated today with injection #2. Tolerated the injection well.  Advised to contact the office or presents to ER should they develop any swelling, erythema or fever

## 2023-08-16 ENCOUNTER — APPOINTMENT (OUTPATIENT)
Dept: ORTHOPEDIC SURGERY | Facility: CLINIC | Age: 63
End: 2023-08-16
Payer: MEDICARE

## 2023-08-16 VITALS — HEIGHT: 72 IN | BODY MASS INDEX: 32.37 KG/M2 | WEIGHT: 239 LBS

## 2023-08-16 PROCEDURE — 99214 OFFICE O/P EST MOD 30 MIN: CPT | Mod: 25

## 2023-08-16 PROCEDURE — 20611 DRAIN/INJ JOINT/BURSA W/US: CPT | Mod: 50

## 2023-08-16 NOTE — HISTORY OF PRESENT ILLNESS
[de-identified] : The patient is here for follow up of b/l shoulders. He reports having moderate relief to both shoulders. The patient reports no AI use but has had relief in the past. He wishes to restart the injections as soon as he can ie 6 months. He had bilateral knee arthroplasties done in 2016 that failed, he required multiple surgeries including revisions therefore he is apprehensive about having the surgery. The patient would like to have cortisone injections done in both shoulders. He states he has had several in both shoulders far more than 3 in each. The patient states he has full ROM, his complaint is circumferential pain worse with range of motion.  5/17/23: Patient here for b/l shoulders. Pt had relief from prior eulexxa injections done by Dr. Guadarrama and wishes to proceed with another round. Pt feels a throbbing pain that keeps him up at night.  Patient has bone-on-bone arthritis of the right shoulder with medialization and left bone-on-bone arthritis as well.  8/2/23: Patient here for b/l shoulder pain. Pt would like to discuss bilateral Euflexxa injections.   8/9/23: Patient here for bilateral shoulder pain. Pt would like to discuss bilateral Euflexxa #2  8/16/23: Patient here for bilateral shoulder pain. Pt would like to discuss bilateral Euflexxa #3

## 2023-08-16 NOTE — DISCUSSION/SUMMARY
[de-identified] : The patient has right shoulder severe glenohumeral osteoarthritis with medialization of the glenoid. He has left shoulder severe glenohumeral osteoarthritis with full thickness tear of the supraspinatus tendon.  The patient reports moderate relief prior injections.  Pt wishes to proceed with Euflexxa  After risks and benefits discussed pt would like to proceed with B/L Euflexxa injection #3 Prior B/l GH injections provided little to no relief  Follow up as medically needed We discussed that patient would be eligible for repeat series of Euflexxa injections at the end of February  (1) We discussed a comprehensive treatment plans that included a prescription management plan involving the use of prescription strength medications to include Ibuprofen 600-800 mg TID, versus 500-650 mg Tylenol. We also discussed prescribing topical diclofenac (Voltaren gel) as well as once daily Meloxicam 15 mg. (2) The patient has More Than One chronic injuries/illnesses as outlined, discussed, and documented by ICD 10 codes listed, as well as the HPI and Plan section. There is a moderate risk of morbidity with further treatment, especially from use of prescription strength medications and possible side effects of these medications which include upset stomach and cardiac/renal issues with long term use were discussed. (3) I recommended that the patient follow-up with their medical physician to discuss any significant specific potential issues with long term use such as interactions with current medications or with exacerbation of underlying medical morbidities.   Attestation: I, Keira Beckett , attest that this documentation has been prepared under the direction and in the presence of Provider Ricardo Mogran MD. The documentation recorded by the scribe, in my presence, accurately reflects the service I personally performed, and the decisions made by me with my edits as appropriate. Ricardo Morgan MD

## 2023-08-16 NOTE — PROCEDURE
[FreeTextEntry3] : Procedure Note: After a long discussion was had with the patient outlining risks/benefits we decided to proceed with a series of Euflexxa injections.   Denies any negative reactions.  Patient was treated today with injection #3. Tolerated the injection well.  Advised to contact the office or presents to ER should they develop any swelling, erythema or fever

## 2024-02-05 ENCOUNTER — OFFICE (OUTPATIENT)
Dept: URBAN - METROPOLITAN AREA CLINIC 105 | Facility: CLINIC | Age: 64
Setting detail: OPHTHALMOLOGY
End: 2024-02-05
Payer: MEDICARE

## 2024-02-05 DIAGNOSIS — H40.013: ICD-10-CM

## 2024-02-05 DIAGNOSIS — H43.393: ICD-10-CM

## 2024-02-05 DIAGNOSIS — H25.013: ICD-10-CM

## 2024-02-05 DIAGNOSIS — H11.153: ICD-10-CM

## 2024-02-05 DIAGNOSIS — H35.033: ICD-10-CM

## 2024-02-05 DIAGNOSIS — H25.12: ICD-10-CM

## 2024-02-05 DIAGNOSIS — H43.811: ICD-10-CM

## 2024-02-05 PROCEDURE — 92014 COMPRE OPH EXAM EST PT 1/>: CPT | Performed by: OPHTHALMOLOGY

## 2024-02-05 PROCEDURE — 92250 FUNDUS PHOTOGRAPHY W/I&R: CPT | Performed by: OPHTHALMOLOGY

## 2024-02-05 ASSESSMENT — REFRACTION_AUTOREFRACTION
OD_CYLINDER: -0.50
OD_SPHERE: +0.25
OS_CYLINDER: -0.50
OS_SPHERE: +0.25
OS_AXIS: 097
OD_AXIS: 019

## 2024-02-05 ASSESSMENT — SPHEQUIV_DERIVED
OD_SPHEQUIV: 0
OS_SPHEQUIV: 0

## 2024-02-05 ASSESSMENT — CONFRONTATIONAL VISUAL FIELD TEST (CVF)
OS_FINDINGS: FULL
OD_FINDINGS: FULL

## 2024-02-21 ENCOUNTER — APPOINTMENT (OUTPATIENT)
Dept: ORTHOPEDIC SURGERY | Facility: CLINIC | Age: 64
End: 2024-02-21
Payer: MEDICARE

## 2024-02-21 PROCEDURE — 73030 X-RAY EXAM OF SHOULDER: CPT | Mod: 50

## 2024-02-21 PROCEDURE — 20611 DRAIN/INJ JOINT/BURSA W/US: CPT | Mod: 50

## 2024-02-21 PROCEDURE — 99214 OFFICE O/P EST MOD 30 MIN: CPT | Mod: 25

## 2024-02-21 NOTE — DISCUSSION/SUMMARY
[de-identified] : The patient has right shoulder severe glenohumeral osteoarthritis with medialization of the glenoid. He has left shoulder severe glenohumeral osteoarthritis with full thickness tear of the supraspinatus tendon.  X-rays of the bilateral shoulders were reviewed today, right shoulder shows has bone-on-bone, left shoulder shows decreased joint space and marginal osteophytes.  The patient reports moderate relief prior injections.  Pt wishes to proceed with Euflexxa  After risks and benefits discussed pt would like to proceed with B/L Euflexxa injection #1 Prior B/l GH injections provided little to no relief  Follow up 1 week for b/l Euflexxa #2   (1) We discussed a comprehensive treatment plans that included a prescription management plan involving the use of prescription strength medications to include Ibuprofen 600-800 mg TID, versus 500-650 mg Tylenol. We also discussed prescribing topical diclofenac (Voltaren gel) as well as once daily Meloxicam 15 mg. (2) The patient has More Than One chronic injuries/illnesses as outlined, discussed, and documented by ICD 10 codes listed, as well as the HPI and Plan section. There is a moderate risk of morbidity with further treatment, especially from use of prescription strength medications and possible side effects of these medications which include upset stomach and cardiac/renal issues with long term use were discussed. (3) I recommended that the patient follow-up with their medical physician to discuss any significant specific potential issues with long term use such as interactions with current medications or with exacerbation of underlying medical morbidities.   Attestation: I, Keira CHILD'Ariam , attest that this documentation has been prepared under the direction and in the presence of Provider Ricardo Morgan MD. The documentation recorded by the scribe, in my presence, accurately reflects the service I personally performed, and the decisions made by me with my edits as appropriate. Ricardo Morgan MD

## 2024-02-21 NOTE — PHYSICAL EXAM
[Normal Coordination] : normal coordination [Normal Sensation] : normal sensation [Normal Mood and Affect] : normal mood and affect [Oriented] : oriented [Able to Communicate] : able to communicate [Normal Skin] : normal skin [No Rash] : no rash [No Ulcers] : no ulcers [No Lesions] : no lesions [No obvious lymphadenopathy in areas examined] : no obvious lymphadenopathy in areas examined [Well Developed] : well developed [Well Nourished] : well nourished [Peripheral vascular exam is grossly normal] : peripheral vascular exam is grossly normal [No Respiratory Distress] : no respiratory distress [Left] : left shoulder [Right] : right shoulder [Glenohumeral arthritis] : Glenohumeral arthritis [FreeTextEntry1] : bone on bone

## 2024-02-21 NOTE — HISTORY OF PRESENT ILLNESS
[de-identified] : The patient is here for follow up of b/l shoulders. He reports having moderate relief to both shoulders. The patient reports no AI use but has had relief in the past. He wishes to restart the injections as soon as he can ie 6 months. He had bilateral knee arthroplasties done in 2016 that failed, he required multiple surgeries including revisions therefore he is apprehensive about having the surgery. The patient would like to have cortisone injections done in both shoulders. He states he has had several in both shoulders far more than 3 in each. The patient states he has full ROM, his complaint is circumferential pain worse with range of motion.  5/17/23: Patient here for b/l shoulders. Pt had relief from prior eulexxa injections done by Dr. Guadarrama and wishes to proceed with another round. Pt feels a throbbing pain that keeps him up at night.  Patient has bone-on-bone arthritis of the right shoulder with medialization and left bone-on-bone arthritis as well.  8/2/23: Patient here for b/l shoulder pain. Pt would like to discuss bilateral Euflexxa injections.   8/9/23: Patient here for bilateral shoulder pain. Pt would like to discuss bilateral Euflexxa #2  8/16/23: Patient here for bilateral shoulder pain. Pt would like to discuss bilateral Euflexxa #3  2/21/24: Patient here for bilateral shoulder pain. Pt wishes to discuss restarting Euflexxa series of 3. Pt noted improvement in pain from last series

## 2024-02-28 ENCOUNTER — APPOINTMENT (OUTPATIENT)
Dept: ORTHOPEDIC SURGERY | Facility: CLINIC | Age: 64
End: 2024-02-28
Payer: MEDICARE

## 2024-02-28 DIAGNOSIS — M25.511 PAIN IN RIGHT SHOULDER: ICD-10-CM

## 2024-02-28 DIAGNOSIS — G89.29 PAIN IN LEFT SHOULDER: ICD-10-CM

## 2024-02-28 DIAGNOSIS — G89.29 PAIN IN RIGHT SHOULDER: ICD-10-CM

## 2024-02-28 DIAGNOSIS — M25.512 PAIN IN LEFT SHOULDER: ICD-10-CM

## 2024-02-28 PROCEDURE — 99214 OFFICE O/P EST MOD 30 MIN: CPT | Mod: 25

## 2024-02-28 PROCEDURE — 20611 DRAIN/INJ JOINT/BURSA W/US: CPT | Mod: 50

## 2024-02-28 NOTE — DISCUSSION/SUMMARY
[de-identified] : The patient has right shoulder severe glenohumeral osteoarthritis with medialization of the glenoid. He has left shoulder severe glenohumeral osteoarthritis with full thickness tear of the supraspinatus tendon.  X-rays of the bilateral shoulders were reviewed right shoulder shows has bone-on-bone, left shoulder shows decreased joint space and marginal osteophytes.  The patient reports moderate relief prior injections.  Pt wishes to proceed with Euflexxa  After risks and benefits discussed pt would like to proceed with B/L Euflexxa injection #2 Prior B/l GH injections provided little to no relief  Follow up 1 week for b/l Euflexxa #3   (1) We discussed a comprehensive treatment plans that included a prescription management plan involving the use of prescription strength medications to include Ibuprofen 600-800 mg TID, versus 500-650 mg Tylenol. We also discussed prescribing topical diclofenac (Voltaren gel) as well as once daily Meloxicam 15 mg. (2) The patient has More Than One chronic injuries/illnesses as outlined, discussed, and documented by ICD 10 codes listed, as well as the HPI and Plan section. There is a moderate risk of morbidity with further treatment, especially from use of prescription strength medications and possible side effects of these medications which include upset stomach and cardiac/renal issues with long term use were discussed. (3) I recommended that the patient follow-up with their medical physician to discuss any significant specific potential issues with long term use such as interactions with current medications or with exacerbation of underlying medical morbidities.   Attestation: I, Keira CHILD'Airam , attest that this documentation has been prepared under the direction and in the presence of Provider Ricardo Morgan MD. The documentation recorded by the scribe, in my presence, accurately reflects the service I personally performed, and the decisions made by me with my edits as appropriate. Ricardo Morgan MD

## 2024-02-28 NOTE — HISTORY OF PRESENT ILLNESS
[de-identified] : The patient is here for follow up of b/l shoulders. He reports having moderate relief to both shoulders. The patient reports no AI use but has had relief in the past. He wishes to restart the injections as soon as he can ie 6 months. He had bilateral knee arthroplasties done in 2016 that failed, he required multiple surgeries including revisions therefore he is apprehensive about having the surgery. The patient would like to have cortisone injections done in both shoulders. He states he has had several in both shoulders far more than 3 in each. The patient states he has full ROM, his complaint is circumferential pain worse with range of motion.  5/17/23: Patient here for b/l shoulders. Pt had relief from prior eulexxa injections done by Dr. Guadarrama and wishes to proceed with another round. Pt feels a throbbing pain that keeps him up at night.  Patient has bone-on-bone arthritis of the right shoulder with medialization and left bone-on-bone arthritis as well.  8/2/23: Patient here for b/l shoulder pain. Pt would like to discuss bilateral Euflexxa injections.   8/9/23: Patient here for bilateral shoulder pain. Pt would like to discuss bilateral Euflexxa #2  8/16/23: Patient here for bilateral shoulder pain. Pt would like to discuss bilateral Euflexxa #3  2/21/24: Patient here for bilateral shoulder pain. Pt wishes to discuss restarting Euflexxa series of 3. Pt noted improvement in pain from last series   2/28/24: Patient here for bilateral shoulder pain. Pt wishes to discuss bilateral Euflexxa #2

## 2024-02-28 NOTE — PROCEDURE
[FreeTextEntry3] : Procedure Note: After a long discussion was had with the patient outlining risks/benefits we decided to proceed with a series of b/l  Euflexxa injections.   Denies any negative reactions.  Patient was treated today with injection #2. Tolerated the injection well.  Advised to contact the office or presents to ER should they develop any swelling, erythema or fever

## 2024-03-01 NOTE — H&P PST ADULT - LAST CARDIAC ANGIOGRAM/IMAGING
Spoke to pt about lab results, she states that her pharmacy is having issues with insurances she will verify again and give us a call if she is unable to get her medication. 
2014 at [facility] in [place]

## 2024-03-06 ENCOUNTER — APPOINTMENT (OUTPATIENT)
Dept: ORTHOPEDIC SURGERY | Facility: CLINIC | Age: 64
End: 2024-03-06
Payer: MEDICARE

## 2024-03-06 DIAGNOSIS — M75.51 BURSITIS OF RIGHT SHOULDER: ICD-10-CM

## 2024-03-06 DIAGNOSIS — M19.012 PRIMARY OSTEOARTHRITIS, LEFT SHOULDER: ICD-10-CM

## 2024-03-06 DIAGNOSIS — M19.011 PRIMARY OSTEOARTHRITIS, RIGHT SHOULDER: ICD-10-CM

## 2024-03-06 DIAGNOSIS — M75.52 BURSITIS OF LEFT SHOULDER: ICD-10-CM

## 2024-03-06 PROCEDURE — 20611 DRAIN/INJ JOINT/BURSA W/US: CPT | Mod: 50

## 2024-03-06 PROCEDURE — 99214 OFFICE O/P EST MOD 30 MIN: CPT | Mod: 25

## 2024-03-06 NOTE — HISTORY OF PRESENT ILLNESS
[de-identified] : The patient is here for follow up of b/l shoulders. He reports having moderate relief to both shoulders. The patient reports no AI use but has had relief in the past. He wishes to restart the injections as soon as he can ie 6 months. He had bilateral knee arthroplasties done in 2016 that failed, he required multiple surgeries including revisions therefore he is apprehensive about having the surgery. The patient would like to have cortisone injections done in both shoulders. He states he has had several in both shoulders far more than 3 in each. The patient states he has full ROM, his complaint is circumferential pain worse with range of motion.  5/17/23: Patient here for b/l shoulders. Pt had relief from prior eulexxa injections done by Dr. Guadarrama and wishes to proceed with another round. Pt feels a throbbing pain that keeps him up at night.  Patient has bone-on-bone arthritis of the right shoulder with medialization and left bone-on-bone arthritis as well.  8/2/23: Patient here for b/l shoulder pain. Pt would like to discuss bilateral Euflexxa injections.   8/9/23: Patient here for bilateral shoulder pain. Pt would like to discuss bilateral Euflexxa #2  8/16/23: Patient here for bilateral shoulder pain. Pt would like to discuss bilateral Euflexxa #3  2/21/24: Patient here for bilateral shoulder pain. Pt wishes to discuss restarting Euflexxa series of 3. Pt noted improvement in pain from last series   2/28/24: Patient here for bilateral shoulder pain. Pt wishes to discuss bilateral Euflexxa #2  3/6/24: Patient here for bilateral shoulder pain. Pt wishes to discuss bilateral Euflexxa #3

## 2024-03-06 NOTE — DISCUSSION/SUMMARY
[de-identified] : The patient has right shoulder severe glenohumeral osteoarthritis with medialization of the glenoid. He has left shoulder severe glenohumeral osteoarthritis with full thickness tear of the supraspinatus tendon.  X-rays of the bilateral shoulders were reviewed right shoulder shows has bone-on-bone, left shoulder shows decreased joint space and marginal osteophytes.  The patient reports moderate relief prior injections.  Pt wishes to proceed with Euflexxa  After risks and benefits discussed pt would like to proceed with B/L Euflexxa injection #3 Prior B/l GH injections provided little to no relief  Follow up as needed   (1) We discussed a comprehensive treatment plans that included a prescription management plan involving the use of prescription strength medications to include Ibuprofen 600-800 mg TID, versus 500-650 mg Tylenol. We also discussed prescribing topical diclofenac (Voltaren gel) as well as once daily Meloxicam 15 mg. (2) The patient has More Than One chronic injuries/illnesses as outlined, discussed, and documented by ICD 10 codes listed, as well as the HPI and Plan section. There is a moderate risk of morbidity with further treatment, especially from use of prescription strength medications and possible side effects of these medications which include upset stomach and cardiac/renal issues with long term use were discussed. (3) I recommended that the patient follow-up with their medical physician to discuss any significant specific potential issues with long term use such as interactions with current medications or with exacerbation of underlying medical morbidities.   Attestation: I, Keira Beckett , attest that this documentation has been prepared under the direction and in the presence of Provider Ricardo Morgan MD. The documentation recorded by the scribe, in my presence, accurately reflects the service I personally performed, and the decisions made by me with my edits as appropriate. Ricardo Morgan MD

## 2024-03-06 NOTE — PROCEDURE
[FreeTextEntry3] : Procedure Note: After a long discussion was had with the patient outlining risks/benefits we decided to proceed with a series of b/l  Euflexxa injections.   Denies any negative reactions.  Patient was treated today with injection #3. Tolerated the injection well.  Advised to contact the office or presents to ER should they develop any swelling, erythema or fever

## 2024-03-06 NOTE — PHYSICAL EXAM
[Normal Coordination] : normal coordination [Normal Sensation] : normal sensation [Normal Mood and Affect] : normal mood and affect [Oriented] : oriented [Able to Communicate] : able to communicate [Normal Skin] : normal skin [No Rash] : no rash [No Ulcers] : no ulcers [No Lesions] : no lesions [No obvious lymphadenopathy in areas examined] : no obvious lymphadenopathy in areas examined [Well Developed] : well developed [Well Nourished] : well nourished [Peripheral vascular exam is grossly normal] : peripheral vascular exam is grossly normal [No Respiratory Distress] : no respiratory distress

## 2024-05-10 RX ORDER — IBUPROFEN 800 MG/1
800 TABLET, FILM COATED ORAL
Qty: 90 | Refills: 0 | Status: ACTIVE | COMMUNITY
Start: 2022-09-08 | End: 1900-01-01

## 2024-05-20 ENCOUNTER — OFFICE (OUTPATIENT)
Dept: URBAN - METROPOLITAN AREA CLINIC 105 | Facility: CLINIC | Age: 64
Setting detail: OPHTHALMOLOGY
End: 2024-05-20
Payer: MEDICARE

## 2024-05-20 ENCOUNTER — RX ONLY (RX ONLY)
Age: 64
End: 2024-05-20

## 2024-05-20 DIAGNOSIS — H11.32: ICD-10-CM

## 2024-05-20 PROBLEM — D23.122 BENIGN NEOPLASM OF SKIN OF EYELID, INCLUDING CANTHUS: Status: ACTIVE | Noted: 2024-05-20

## 2024-05-20 PROCEDURE — 99213 OFFICE O/P EST LOW 20 MIN: CPT | Performed by: OPHTHALMOLOGY

## 2024-05-20 ASSESSMENT — CONFRONTATIONAL VISUAL FIELD TEST (CVF)
OD_FINDINGS: FULL
OS_FINDINGS: FULL

## 2024-05-28 ENCOUNTER — NON-APPOINTMENT (OUTPATIENT)
Age: 64
End: 2024-05-28

## 2024-05-29 DIAGNOSIS — B35.1 TINEA UNGUIUM: ICD-10-CM

## 2024-05-29 DIAGNOSIS — M77.32 CALCANEAL SPUR, LEFT FOOT: ICD-10-CM

## 2024-05-29 RX ORDER — TERBINAFINE HYDROCHLORIDE 250 MG/1
250 TABLET ORAL
Refills: 0 | Status: ACTIVE | COMMUNITY

## 2024-05-29 RX ORDER — GABAPENTIN 100 MG/1
100 CAPSULE ORAL
Refills: 0 | Status: ACTIVE | COMMUNITY

## 2024-05-29 RX ORDER — PANTOPRAZOLE 40 MG/1
40 TABLET, DELAYED RELEASE ORAL
Refills: 0 | Status: ACTIVE | COMMUNITY

## 2024-05-29 RX ORDER — MELOXICAM 15 MG/1
15 TABLET ORAL
Refills: 0 | Status: ACTIVE | COMMUNITY

## 2024-05-29 RX ORDER — ROSUVASTATIN CALCIUM 10 MG/1
10 TABLET, FILM COATED ORAL
Refills: 0 | Status: ACTIVE | COMMUNITY

## 2024-05-29 RX ORDER — VALSARTAN AND HYDROCHLOROTHIAZIDE 320; 25 MG/1; MG/1
320-25 TABLET, FILM COATED ORAL
Refills: 0 | Status: ACTIVE | COMMUNITY

## 2024-05-30 ENCOUNTER — APPOINTMENT (OUTPATIENT)
Dept: PODIATRY | Facility: CLINIC | Age: 64
End: 2024-05-30

## 2024-05-30 DIAGNOSIS — M72.2 PLANTAR FASCIAL FIBROMATOSIS: ICD-10-CM

## 2024-05-30 DIAGNOSIS — M79.672 PAIN IN LEFT FOOT: ICD-10-CM

## 2024-05-30 PROCEDURE — 99213 OFFICE O/P EST LOW 20 MIN: CPT | Mod: 25

## 2024-05-30 PROCEDURE — 99203 OFFICE O/P NEW LOW 30 MIN: CPT | Mod: 25

## 2024-05-30 PROCEDURE — 20550 NJX 1 TENDON SHEATH/LIGAMENT: CPT | Mod: LT

## 2024-05-30 RX ORDER — PANTOPRAZOLE SODIUM 40 MG/1
40 GRANULE, DELAYED RELEASE ORAL
Refills: 0 | Status: ACTIVE | COMMUNITY

## 2024-06-01 PROBLEM — M79.672 LEFT FOOT PAIN: Status: ACTIVE | Noted: 2024-06-01

## 2024-06-01 PROBLEM — M72.2 PLANTAR FASCIITIS, LEFT: Status: ACTIVE | Noted: 2024-05-29

## 2024-06-01 NOTE — HISTORY OF PRESENT ILLNESS
[FreeTextEntry1] : Oscar Christian returns stating that about two months ago, his cardiologist took him off the meloxicam, which he was taking and he noticed over the past few weeks left foot pain in his heel and arch and swelling in his foot.  He states that he took a few meloxicam but that did not help, so he came in hoping he had a cortisone injection.

## 2024-06-01 NOTE — PHYSICAL EXAM
[TextEntry] : There is swelling and pain in the plantar medial aspect of the left heel.  Increased skin temperature is noted.  Ecchymosis is absent.

## 2024-06-01 NOTE — ASSESSMENT
[FreeTextEntry1] : Assessment: Plantar fasciitis with heel spur syndrome, left foot.  Plan:   After further discussion it was agreed to give the patient an injection to reduce inflammation and associated pain and to improve function.  The area to be injected was wiped thoroughly with sterile alcohol.  Then using a combination of 1.0 cc of Celestone acetate suspension combined with 1cc of Dexamethasone sodium phosphate and 1.0cc of Lidocaine plain, an injection was given at the left heel.   Sterile gauze was used for compression and then a sterile Band-Aid was applied to the injection site. The possibility of a flair reaction was discussed with the patient prior to administering the injection.  A compression strapping was applied was applied the foot and ankle for support and to help reduce swelling and pain and improve function.  After further assessment the left foot was placed in approximately 90 degrees to the ankle with the foot in as close to neutral position as possible.  The foot and ankle were prepped to be clean and dry.  Pre tape spray may have been used on the foot and ankle as indicated.  Pre-tape adhesive spray provides an extra sticky, waterproof barrier, that helps tapes and strapping stick better to the skin. Perfect for oily skin, sweaty skin, water sports and extreme environments. It can be a game-changer when strapping challenging areas such as sweaty or wet ankles & feet. I then applied a strapping to the left foot and ankle for support and compression to help relieve pressure and symptoms related to the patient's foot/ankle problem.   The strapping can aid recovery by supporting the muscles, tendons, or ligaments around the affected painful area, as well as improving blood flow to the area. Plus, the strapping will reduce pain and improve the gait, preventing further injury from poor alignment. The strapping also provides proprioceptive feedback. The strapping can reduce the amount of stretching and moving the ligaments do when the patient is weight bearing. This not only gives tissues a better chance to heal, but it also helps prevent further damage. This strapping also minimizes foot pronation, collapse, or flattening which causes over use and irritation to the muscle, tendons, and ligaments in the foot. Often, relief of pain with the strapping is a diagnostic indication for the need of functional orthotic devices. In general, strappings may be used to treat strains, sprains, dislocations, and some fractures. Strapping the foot and ankle provides the external stabilization that stretched ligaments (tissues connecting bone to bone) need while they heal. Most studies show that strapping the foot and ankle decreases the incidence and severity of the affected area.  Besides physical support, strapping can increase biofeedback and increase proprioception, which is a body's ability to know where it is thus aiding in healing and reducing further exacerbation of the pain. The patient was instructed to leave the strapping in place for the next few days to a week, if it was comfortable.  The patient was advised to keep the strapping dry, but leave it on and dry with a hair dryer if it got wet.   The patient was further instructed that if the strapping was uncomfortable or causes any problems it should be removed right away and the office notified.  I discouraged him from taking the meloxicam without consulting his cardiologist.  PTR:  2 weeks.

## 2024-07-10 ENCOUNTER — APPOINTMENT (OUTPATIENT)
Dept: ORTHOPEDIC SURGERY | Facility: CLINIC | Age: 64
End: 2024-07-10
Payer: MEDICARE

## 2024-07-10 DIAGNOSIS — M19.011 PRIMARY OSTEOARTHRITIS, RIGHT SHOULDER: ICD-10-CM

## 2024-07-10 DIAGNOSIS — M75.51 BURSITIS OF RIGHT SHOULDER: ICD-10-CM

## 2024-07-10 DIAGNOSIS — M19.012 PRIMARY OSTEOARTHRITIS, LEFT SHOULDER: ICD-10-CM

## 2024-07-10 PROCEDURE — 99214 OFFICE O/P EST MOD 30 MIN: CPT | Mod: 25

## 2024-07-10 PROCEDURE — 20611 DRAIN/INJ JOINT/BURSA W/US: CPT | Mod: 50

## 2024-08-05 ENCOUNTER — NON-APPOINTMENT (OUTPATIENT)
Age: 64
End: 2024-08-05

## 2024-08-15 ENCOUNTER — APPOINTMENT (OUTPATIENT)
Dept: PULMONOLOGY | Facility: CLINIC | Age: 64
End: 2024-08-15
Payer: MEDICARE

## 2024-08-15 VITALS — SYSTOLIC BLOOD PRESSURE: 138 MMHG | DIASTOLIC BLOOD PRESSURE: 68 MMHG

## 2024-08-15 VITALS
HEART RATE: 67 BPM | RESPIRATION RATE: 16 BRPM | WEIGHT: 236 LBS | BODY MASS INDEX: 31.28 KG/M2 | OXYGEN SATURATION: 97 % | HEIGHT: 73 IN

## 2024-08-15 DIAGNOSIS — E66.9 OBESITY, UNSPECIFIED: ICD-10-CM

## 2024-08-15 DIAGNOSIS — R06.02 SHORTNESS OF BREATH: ICD-10-CM

## 2024-08-15 DIAGNOSIS — R06.00 DYSPNEA, UNSPECIFIED: ICD-10-CM

## 2024-08-15 DIAGNOSIS — G47.33 OBSTRUCTIVE SLEEP APNEA (ADULT) (PEDIATRIC): ICD-10-CM

## 2024-08-15 PROCEDURE — 99214 OFFICE O/P EST MOD 30 MIN: CPT

## 2024-08-15 RX ORDER — GABAPENTIN 600 MG/1
600 TABLET, COATED ORAL
Refills: 0 | Status: ACTIVE | COMMUNITY

## 2024-08-15 NOTE — DISCUSSION/SUMMARY
[FreeTextEntry1] : #1. PFTs performed previously essentially normal. #2. The patient does not appear to require chronic BD therapy at this time; Albuterol inhaler as needed #3. SOBOE is likely related to weight or deconditioning given normal PFTs #4. Diet and exercise for weight loss #5. Continue autoCPAP to treat mild MASOOD with an AHI of 14. The patient is using and benefitting from CPAP therapy. #6. Replace equipment as needed; ordered 8/15/24 with AirFit N30i mask. #7. Prior CXR from 12/9/20 was clear; prior chest CT from 2017 revealed stable nodules going back to 2013. No further imaging is required at this time. #8. S/p both Covid vaccines;s/p Covid infections #9. Could consider further w/u for asthma with MCT if desired by pt for occasional SOB and wheeze but he would like to hold off for now and will use his Albuterol inhaler as needed but rarely requires #10. F/u at least annually with compliance.  The patient expressed understanding and agreement with the above recommendations/plan and accepts responsibility to be compliant with recommended testing, therapies, and f/u visits. All relevant questions and concerns were addressed.

## 2024-08-15 NOTE — CONSULT LETTER
[Dear  ___] : Dear  [unfilled], [Consult Letter:] : I had the pleasure of evaluating your patient, [unfilled]. [Please see my note below.] : Please see my note below. [Consult Closing:] : Thank you very much for allowing me to participate in the care of this patient.  If you have any questions, please do not hesitate to contact me. [Sincerely,] : Sincerely, [FreeTextEntry3] : Alton Montgomery MD, FCCP, D. ABSM\par  Pulmonary and Sleep Medicine\par  [facility] at [place]

## 2024-08-15 NOTE — RESULTS/DATA
[TextEntry] : Chest CT imaging as above.  HST from 5/17/14 revealed mild MASOOD with an AHI of 14. CXR from 12/9/20 was clear. The patient's overall compliance is 100% with a >4hr compliance of 100% on autoCPAP with a median and max pressure of 7.4 and 10.3 cm of water, respectively with a residual AHI of 2 which is normal; no current compliance is available.

## 2024-08-15 NOTE — HISTORY OF PRESENT ILLNESS
[Good Compliance] : good compliance with treatment [Good Tolerance] : good tolerance of treatment [Good Symptom Control] : good symptom control [Follow-Up - Routine Clinic] : a routine clinic follow-up of [Excess Weight] : excess weight [Currently Experiencing] : The patient is currently experiencing symptoms. [Dyspnea] : dyspnea [Knee Pain] : knee pain [Back Pain] : back pain [Low Calorie Diet] : low calorie diet [Fair Compliance] : fair compliance with treatment [Fair Tolerance] : fair tolerance of treatment [Poor Symptom Control] : poor symptom control [Sleep Apnea] : sleep apnea [Hypertension] : hypertension [High] : high [Low Calorie] : low calorie [Well Balanced Diet] : well balanced meals [___ Times/Week] : exercises [unfilled] times per week [Aerobic Conditioning] : aerobic conditioning [On ___] : performed on [unfilled] [Patient] : the patient [To Assess ___] : to assess [unfilled] [None] : no new symptoms reported [TextBox_4] : Never smoker\par  The patient is a never smoker but uses marijuana daily.\par  The patient has a loop recorder.\par  S/p Covid infection 9/2021 and again 12/6/22 with mild URI symptoms and was given Z-shreya both times.\par  He presents for preop evaluation for back surgery. [de-identified] : autoCPAP [FreeTextEntry9] : Chest CT [FreeTextEntry8] : Stable nodules going back to 2013

## 2024-10-23 ENCOUNTER — APPOINTMENT (OUTPATIENT)
Dept: ORTHOPEDIC SURGERY | Facility: CLINIC | Age: 64
End: 2024-10-23
Payer: MEDICARE

## 2024-10-23 DIAGNOSIS — M19.011 PRIMARY OSTEOARTHRITIS, RIGHT SHOULDER: ICD-10-CM

## 2024-10-23 PROCEDURE — 20611 DRAIN/INJ JOINT/BURSA W/US: CPT | Mod: 50

## 2024-10-23 PROCEDURE — 99214 OFFICE O/P EST MOD 30 MIN: CPT | Mod: 25,57

## 2024-10-30 ENCOUNTER — APPOINTMENT (OUTPATIENT)
Dept: ORTHOPEDIC SURGERY | Facility: CLINIC | Age: 64
End: 2024-10-30
Payer: MEDICARE

## 2024-10-30 PROCEDURE — 99214 OFFICE O/P EST MOD 30 MIN: CPT | Mod: 25

## 2024-10-30 PROCEDURE — 20611 DRAIN/INJ JOINT/BURSA W/US: CPT | Mod: 50

## 2024-11-06 ENCOUNTER — APPOINTMENT (OUTPATIENT)
Dept: ORTHOPEDIC SURGERY | Facility: CLINIC | Age: 64
End: 2024-11-06
Payer: MEDICARE

## 2024-11-06 DIAGNOSIS — M25.511 PAIN IN RIGHT SHOULDER: ICD-10-CM

## 2024-11-06 DIAGNOSIS — M19.012 PRIMARY OSTEOARTHRITIS, LEFT SHOULDER: ICD-10-CM

## 2024-11-06 DIAGNOSIS — M75.52 BURSITIS OF LEFT SHOULDER: ICD-10-CM

## 2024-11-06 DIAGNOSIS — M19.011 PRIMARY OSTEOARTHRITIS, RIGHT SHOULDER: ICD-10-CM

## 2024-11-06 DIAGNOSIS — G89.29 PAIN IN LEFT SHOULDER: ICD-10-CM

## 2024-11-06 DIAGNOSIS — M75.51 BURSITIS OF RIGHT SHOULDER: ICD-10-CM

## 2024-11-06 DIAGNOSIS — M25.512 PAIN IN LEFT SHOULDER: ICD-10-CM

## 2024-11-06 DIAGNOSIS — G89.29 PAIN IN RIGHT SHOULDER: ICD-10-CM

## 2024-11-06 PROCEDURE — 99214 OFFICE O/P EST MOD 30 MIN: CPT | Mod: 25,57

## 2024-11-06 PROCEDURE — 20611 DRAIN/INJ JOINT/BURSA W/US: CPT | Mod: 50

## 2024-12-05 NOTE — ED ADULT TRIAGE NOTE - BMI (KG/M2)
Quality 47: Advance Care Plan: Advance Care Planning discussed and documented; advance care plan or surrogate decision maker documented in the medical record. Quality 226: Preventive Care And Screening: Tobacco Use: Screening And Cessation Intervention: Patient screened for tobacco use and is an ex/non-smoker Detail Level: Detailed 30.2

## 2025-01-08 ENCOUNTER — APPOINTMENT (OUTPATIENT)
Dept: PULMONOLOGY | Facility: CLINIC | Age: 65
End: 2025-01-08

## 2025-01-08 ENCOUNTER — APPOINTMENT (OUTPATIENT)
Dept: PULMONOLOGY | Facility: CLINIC | Age: 65
End: 2025-01-08
Payer: MEDICARE

## 2025-01-08 ENCOUNTER — APPOINTMENT (OUTPATIENT)
Dept: ORTHOPEDIC SURGERY | Facility: CLINIC | Age: 65
End: 2025-01-08
Payer: MEDICARE

## 2025-01-08 VITALS
SYSTOLIC BLOOD PRESSURE: 127 MMHG | HEART RATE: 69 BPM | RESPIRATION RATE: 16 BRPM | HEIGHT: 72 IN | BODY MASS INDEX: 32.1 KG/M2 | DIASTOLIC BLOOD PRESSURE: 72 MMHG | WEIGHT: 237 LBS | OXYGEN SATURATION: 98 %

## 2025-01-08 VITALS — HEIGHT: 72 IN | WEIGHT: 237 LBS | BODY MASS INDEX: 32.1 KG/M2

## 2025-01-08 DIAGNOSIS — R06.02 SHORTNESS OF BREATH: ICD-10-CM

## 2025-01-08 DIAGNOSIS — M19.012 PRIMARY OSTEOARTHRITIS, LEFT SHOULDER: ICD-10-CM

## 2025-01-08 DIAGNOSIS — E66.9 OBESITY, UNSPECIFIED: ICD-10-CM

## 2025-01-08 DIAGNOSIS — Z87.898 PERSONAL HISTORY OF OTHER SPECIFIED CONDITIONS: ICD-10-CM

## 2025-01-08 DIAGNOSIS — Z01.811 ENCOUNTER FOR PREPROCEDURAL RESPIRATORY EXAMINATION: ICD-10-CM

## 2025-01-08 DIAGNOSIS — M75.51 BURSITIS OF RIGHT SHOULDER: ICD-10-CM

## 2025-01-08 DIAGNOSIS — M75.52 BURSITIS OF LEFT SHOULDER: ICD-10-CM

## 2025-01-08 DIAGNOSIS — G47.33 OBSTRUCTIVE SLEEP APNEA (ADULT) (PEDIATRIC): ICD-10-CM

## 2025-01-08 DIAGNOSIS — M19.011 PRIMARY OSTEOARTHRITIS, RIGHT SHOULDER: ICD-10-CM

## 2025-01-08 PROCEDURE — 99214 OFFICE O/P EST MOD 30 MIN: CPT | Mod: 25

## 2025-01-08 PROCEDURE — 99214 OFFICE O/P EST MOD 30 MIN: CPT | Mod: 25,57

## 2025-01-08 PROCEDURE — 94010 BREATHING CAPACITY TEST: CPT

## 2025-01-08 PROCEDURE — 20610 DRAIN/INJ JOINT/BURSA W/O US: CPT | Mod: RT

## 2025-05-07 ENCOUNTER — APPOINTMENT (OUTPATIENT)
Dept: ORTHOPEDIC SURGERY | Facility: CLINIC | Age: 65
End: 2025-05-07
Payer: MEDICARE

## 2025-05-07 DIAGNOSIS — M19.012 PRIMARY OSTEOARTHRITIS, LEFT SHOULDER: ICD-10-CM

## 2025-05-07 DIAGNOSIS — M75.51 BURSITIS OF RIGHT SHOULDER: ICD-10-CM

## 2025-05-07 DIAGNOSIS — M75.52 BURSITIS OF LEFT SHOULDER: ICD-10-CM

## 2025-05-07 DIAGNOSIS — M19.011 PRIMARY OSTEOARTHRITIS, RIGHT SHOULDER: ICD-10-CM

## 2025-05-07 PROCEDURE — 99214 OFFICE O/P EST MOD 30 MIN: CPT

## 2025-05-07 RX ORDER — METHYLPREDNISOLONE 4 MG/1
4 TABLET ORAL
Qty: 1 | Refills: 0 | Status: ACTIVE | COMMUNITY
Start: 2025-05-07 | End: 1900-01-01

## 2025-06-18 ENCOUNTER — OFFICE (OUTPATIENT)
Dept: URBAN - METROPOLITAN AREA CLINIC 1 | Facility: CLINIC | Age: 65
Setting detail: OPHTHALMOLOGY
End: 2025-06-18
Payer: MEDICARE

## 2025-06-18 VITALS — HEIGHT: 60 IN

## 2025-06-18 DIAGNOSIS — H11.153: ICD-10-CM

## 2025-06-18 DIAGNOSIS — H25.13: ICD-10-CM

## 2025-06-18 DIAGNOSIS — H43.813: ICD-10-CM

## 2025-06-18 PROCEDURE — 92014 COMPRE OPH EXAM EST PT 1/>: CPT

## 2025-06-18 ASSESSMENT — PACHYMETRY
OD_CT_UM: 484
OS_CT_UM: 479
OS_CT_CORRECTION: 5
OD_CT_CORRECTION: 4

## 2025-06-18 ASSESSMENT — KERATOMETRY
OD_AXISANGLE_DEGREES: 103
OD_K2POWER_DIOPTERS: 43.00
OD_K1POWER_DIOPTERS: 42.00
OS_K2POWER_DIOPTERS: 42.25
OS_AXISANGLE_DEGREES: 060
OS_K1POWER_DIOPTERS: 42.00

## 2025-06-18 ASSESSMENT — VISUAL ACUITY
OS_BCVA: 20/20
OD_BCVA: 20/20

## 2025-06-18 ASSESSMENT — CONFRONTATIONAL VISUAL FIELD TEST (CVF)
OD_FINDINGS: FULL
OS_FINDINGS: FULL

## 2025-06-18 ASSESSMENT — REFRACTION_AUTOREFRACTION
OS_SPHERE: +0.25
OD_SPHERE: +0.50
OS_CYLINDER: -0.25
OS_AXIS: 092
OD_CYLINDER: -0.50
OD_AXIS: 008

## 2025-06-18 ASSESSMENT — TONOMETRY
OS_IOP_MMHG: 15
OD_IOP_MMHG: 16

## 2025-07-16 ENCOUNTER — OFFICE (OUTPATIENT)
Dept: URBAN - METROPOLITAN AREA CLINIC 1 | Facility: CLINIC | Age: 65
Setting detail: OPHTHALMOLOGY
End: 2025-07-16
Payer: MEDICARE

## 2025-07-16 DIAGNOSIS — H43.813: ICD-10-CM

## 2025-07-16 DIAGNOSIS — H25.13: ICD-10-CM

## 2025-07-16 DIAGNOSIS — H11.153: ICD-10-CM

## 2025-07-16 PROCEDURE — 92014 COMPRE OPH EXAM EST PT 1/>: CPT

## 2025-07-16 ASSESSMENT — REFRACTION_AUTOREFRACTION
OD_SPHERE: +0.25
OS_AXIS: 099
OS_SPHERE: 0.00
OD_AXIS: 018
OS_CYLINDER: -0.25
OD_CYLINDER: -0.25

## 2025-07-16 ASSESSMENT — PACHYMETRY
OS_CT_CORRECTION: 5
OD_CT_CORRECTION: 4
OD_CT_UM: 484
OS_CT_UM: 479

## 2025-07-16 ASSESSMENT — VISUAL ACUITY
OD_BCVA: 20/20-1
OS_BCVA: 20/20-1

## 2025-07-16 ASSESSMENT — KERATOMETRY
OS_AXISANGLE_DEGREES: 008
OD_K2POWER_DIOPTERS: 43.00
OS_K2POWER_DIOPTERS: 42.50
OS_K1POWER_DIOPTERS: 41.75
OD_AXISANGLE_DEGREES: 098
OD_K1POWER_DIOPTERS: 42.25

## 2025-07-16 ASSESSMENT — CONFRONTATIONAL VISUAL FIELD TEST (CVF)
OD_FINDINGS: FULL
OS_FINDINGS: FULL

## 2025-07-16 ASSESSMENT — TONOMETRY
OD_IOP_MMHG: 13
OS_IOP_MMHG: 13

## 2025-07-29 ENCOUNTER — APPOINTMENT (OUTPATIENT)
Dept: PULMONOLOGY | Facility: CLINIC | Age: 65
End: 2025-07-29
Payer: MEDICARE

## 2025-07-29 VITALS
HEART RATE: 56 BPM | HEIGHT: 72 IN | RESPIRATION RATE: 16 BRPM | WEIGHT: 235 LBS | OXYGEN SATURATION: 96 % | SYSTOLIC BLOOD PRESSURE: 118 MMHG | DIASTOLIC BLOOD PRESSURE: 62 MMHG | BODY MASS INDEX: 31.83 KG/M2

## 2025-07-29 DIAGNOSIS — G47.33 OBSTRUCTIVE SLEEP APNEA (ADULT) (PEDIATRIC): ICD-10-CM

## 2025-07-29 DIAGNOSIS — E66.9 OBESITY, UNSPECIFIED: ICD-10-CM

## 2025-07-29 DIAGNOSIS — R06.02 SHORTNESS OF BREATH: ICD-10-CM

## 2025-07-29 PROCEDURE — 99214 OFFICE O/P EST MOD 30 MIN: CPT

## 2025-07-29 PROCEDURE — G2211 COMPLEX E/M VISIT ADD ON: CPT

## 2025-07-29 RX ORDER — TRAMADOL HYDROCHLORIDE 50 MG/1
50 TABLET, COATED ORAL
Refills: 0 | Status: ACTIVE | COMMUNITY

## 2025-07-29 RX ORDER — PREGABALIN 50 MG/1
50 CAPSULE ORAL
Refills: 0 | Status: ACTIVE | COMMUNITY

## 2025-07-29 RX ORDER — ROSUVASTATIN CALCIUM 10 MG/1
10 TABLET, FILM COATED ORAL
Refills: 0 | Status: ACTIVE | COMMUNITY

## 2025-07-29 RX ORDER — HYDRALAZINE HYDROCHLORIDE 100 MG/1
100 TABLET ORAL
Refills: 0 | Status: ACTIVE | COMMUNITY

## 2025-09-03 ENCOUNTER — APPOINTMENT (OUTPATIENT)
Dept: ORTHOPEDIC SURGERY | Facility: CLINIC | Age: 65
End: 2025-09-03
Payer: MEDICARE

## 2025-09-03 DIAGNOSIS — M25.511 PAIN IN RIGHT SHOULDER: ICD-10-CM

## 2025-09-03 DIAGNOSIS — M19.011 PRIMARY OSTEOARTHRITIS, RIGHT SHOULDER: ICD-10-CM

## 2025-09-03 DIAGNOSIS — M75.51 BURSITIS OF RIGHT SHOULDER: ICD-10-CM

## 2025-09-03 DIAGNOSIS — G89.29 PAIN IN RIGHT SHOULDER: ICD-10-CM

## 2025-09-03 PROCEDURE — 99214 OFFICE O/P EST MOD 30 MIN: CPT

## 2025-09-03 PROCEDURE — 73010 X-RAY EXAM OF SHOULDER BLADE: CPT | Mod: RT

## 2025-09-03 PROCEDURE — 73030 X-RAY EXAM OF SHOULDER: CPT | Mod: RT

## 2025-09-03 RX ORDER — OXYCODONE 5 MG/1
5 TABLET ORAL
Qty: 40 | Refills: 0 | Status: ACTIVE | COMMUNITY
Start: 2025-09-03 | End: 1900-01-01

## 2025-09-16 ENCOUNTER — APPOINTMENT (OUTPATIENT)
Dept: ORTHOPEDIC SURGERY | Facility: HOSPITAL | Age: 65
End: 2025-09-16
Payer: MEDICARE

## 2025-09-16 PROCEDURE — 15777 ACELLULAR DERM MATRIX IMPLT: CPT | Mod: 59,RT

## 2025-09-16 PROCEDURE — 23472 RECONSTRUCT SHOULDER JOINT: CPT | Mod: RT

## 2025-09-16 PROCEDURE — 23395 MUSCLE TRANSFER SHOULDER/ARM: CPT | Mod: 59,RT

## 2025-09-24 PROBLEM — Z96.611 PRESENCE OF RIGHT ARTIFICIAL SHOULDER JOINT: Status: ACTIVE | Noted: 2025-09-24
